# Patient Record
Sex: MALE | Race: BLACK OR AFRICAN AMERICAN | NOT HISPANIC OR LATINO | Employment: OTHER | URBAN - METROPOLITAN AREA
[De-identification: names, ages, dates, MRNs, and addresses within clinical notes are randomized per-mention and may not be internally consistent; named-entity substitution may affect disease eponyms.]

---

## 2024-10-10 ENCOUNTER — HOSPITAL ENCOUNTER (EMERGENCY)
Facility: HOSPITAL | Age: 74
Discharge: HOME | End: 2024-10-11
Attending: EMERGENCY MEDICINE
Payer: MEDICARE

## 2024-10-10 DIAGNOSIS — K94.01: Primary | ICD-10-CM

## 2024-10-10 LAB
ABO GROUP (TYPE) IN BLOOD: NORMAL
ALBUMIN SERPL BCP-MCNC: 4.5 G/DL (ref 3.4–5)
ALP SERPL-CCNC: 43 U/L (ref 33–136)
ALT SERPL W P-5'-P-CCNC: 9 U/L (ref 10–52)
ANION GAP SERPL CALC-SCNC: 13 MMOL/L (ref 10–20)
ANTIBODY SCREEN: NORMAL
APTT PPP: 39 SECONDS (ref 27–38)
AST SERPL W P-5'-P-CCNC: 20 U/L (ref 9–39)
BASOPHILS # BLD AUTO: 0.05 X10*3/UL (ref 0–0.1)
BASOPHILS NFR BLD AUTO: 0.7 %
BILIRUB SERPL-MCNC: 0.3 MG/DL (ref 0–1.2)
BUN SERPL-MCNC: 26 MG/DL (ref 6–23)
CALCIUM SERPL-MCNC: 9.4 MG/DL (ref 8.6–10.3)
CHLORIDE SERPL-SCNC: 103 MMOL/L (ref 98–107)
CO2 SERPL-SCNC: 25 MMOL/L (ref 21–32)
CREAT SERPL-MCNC: 1.83 MG/DL (ref 0.5–1.3)
EGFRCR SERPLBLD CKD-EPI 2021: 38 ML/MIN/1.73M*2
EOSINOPHIL # BLD AUTO: 0.32 X10*3/UL (ref 0–0.4)
EOSINOPHIL NFR BLD AUTO: 4.4 %
ERYTHROCYTE [DISTWIDTH] IN BLOOD BY AUTOMATED COUNT: 14.8 % (ref 11.5–14.5)
GLUCOSE SERPL-MCNC: 103 MG/DL (ref 74–99)
HCT VFR BLD AUTO: 33.3 % (ref 41–52)
HGB BLD-MCNC: 10.7 G/DL (ref 13.5–17.5)
IMM GRANULOCYTES # BLD AUTO: 0.03 X10*3/UL (ref 0–0.5)
IMM GRANULOCYTES NFR BLD AUTO: 0.4 % (ref 0–0.9)
INR PPP: 1 (ref 0.9–1.1)
LYMPHOCYTES # BLD AUTO: 1.32 X10*3/UL (ref 0.8–3)
LYMPHOCYTES NFR BLD AUTO: 18.2 %
MCH RBC QN AUTO: 24.1 PG (ref 26–34)
MCHC RBC AUTO-ENTMCNC: 32.1 G/DL (ref 32–36)
MCV RBC AUTO: 75 FL (ref 80–100)
MONOCYTES # BLD AUTO: 1.27 X10*3/UL (ref 0.05–0.8)
MONOCYTES NFR BLD AUTO: 17.5 %
NEUTROPHILS # BLD AUTO: 4.27 X10*3/UL (ref 1.6–5.5)
NEUTROPHILS NFR BLD AUTO: 58.8 %
NRBC BLD-RTO: 0 /100 WBCS (ref 0–0)
PLATELET # BLD AUTO: 313 X10*3/UL (ref 150–450)
POTASSIUM SERPL-SCNC: 4.6 MMOL/L (ref 3.5–5.3)
PROT SERPL-MCNC: 8.2 G/DL (ref 6.4–8.2)
PROTHROMBIN TIME: 11.1 SECONDS (ref 9.8–12.8)
RBC # BLD AUTO: 4.44 X10*6/UL (ref 4.5–5.9)
RH FACTOR (ANTIGEN D): NORMAL
SODIUM SERPL-SCNC: 136 MMOL/L (ref 136–145)
WBC # BLD AUTO: 7.3 X10*3/UL (ref 4.4–11.3)

## 2024-10-10 PROCEDURE — 85730 THROMBOPLASTIN TIME PARTIAL: CPT | Performed by: EMERGENCY MEDICINE

## 2024-10-10 PROCEDURE — 85610 PROTHROMBIN TIME: CPT | Performed by: EMERGENCY MEDICINE

## 2024-10-10 PROCEDURE — 86901 BLOOD TYPING SEROLOGIC RH(D): CPT | Performed by: EMERGENCY MEDICINE

## 2024-10-10 PROCEDURE — 80053 COMPREHEN METABOLIC PANEL: CPT | Performed by: EMERGENCY MEDICINE

## 2024-10-10 PROCEDURE — 99283 EMERGENCY DEPT VISIT LOW MDM: CPT

## 2024-10-10 PROCEDURE — 85025 COMPLETE CBC W/AUTO DIFF WBC: CPT | Performed by: EMERGENCY MEDICINE

## 2024-10-10 PROCEDURE — 36415 COLL VENOUS BLD VENIPUNCTURE: CPT | Performed by: EMERGENCY MEDICINE

## 2024-10-10 ASSESSMENT — LIFESTYLE VARIABLES
HAVE YOU EVER FELT YOU SHOULD CUT DOWN ON YOUR DRINKING: NO
EVER FELT BAD OR GUILTY ABOUT YOUR DRINKING: NO
TOTAL SCORE: 0
EVER HAD A DRINK FIRST THING IN THE MORNING TO STEADY YOUR NERVES TO GET RID OF A HANGOVER: NO
HAVE PEOPLE ANNOYED YOU BY CRITICIZING YOUR DRINKING: NO

## 2024-10-10 ASSESSMENT — COLUMBIA-SUICIDE SEVERITY RATING SCALE - C-SSRS
1. IN THE PAST MONTH, HAVE YOU WISHED YOU WERE DEAD OR WISHED YOU COULD GO TO SLEEP AND NOT WAKE UP?: NO
2. HAVE YOU ACTUALLY HAD ANY THOUGHTS OF KILLING YOURSELF?: NO
6. HAVE YOU EVER DONE ANYTHING, STARTED TO DO ANYTHING, OR PREPARED TO DO ANYTHING TO END YOUR LIFE?: NO

## 2024-10-11 VITALS
HEART RATE: 73 BPM | OXYGEN SATURATION: 99 % | BODY MASS INDEX: 26.81 KG/M2 | SYSTOLIC BLOOD PRESSURE: 149 MMHG | WEIGHT: 181 LBS | HEIGHT: 69 IN | DIASTOLIC BLOOD PRESSURE: 73 MMHG | RESPIRATION RATE: 15 BRPM

## 2024-10-11 ASSESSMENT — PAIN SCALES - GENERAL: PAINLEVEL_OUTOF10: 0 - NO PAIN

## 2024-10-11 NOTE — ED PROVIDER NOTES
HPI   Chief Complaint   Patient presents with    Colostomy Issues       This is a 74-year-old male who presents from nursing home for blood in his ostomy.  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  Patient says that he has often had blood in his ostomy and says that staff noticed a large clot when they change the ostomy bag as per protocol this evening.  He denies any abdominal pain.  He is not on any blood thinners aside from baby aspirin as per SNF record.      History provided by:  Medical records, nursing home and patient   used: No            Patient History   No past medical history on file.  Past Surgical History:   Procedure Laterality Date    MR HEAD ANGIO WO IV CONTRAST  2/4/2018    MR HEAD ANGIO WO IV CONTRAST 2/4/2018 [facility] CLINICAL LEGACY    MR NECK ANGIO WO IV CONTRAST  2/4/2018    MR NECK ANGIO WO IV CONTRAST 2/4/2018 [facility] CLINICAL LEGACY     No family history on file.  Social History     Tobacco Use    Smoking status: Not on file    Smokeless tobacco: Not on file   Substance Use Topics    Alcohol use: Not on file    Drug use: Not on file       Physical Exam   ED Triage Vitals [10/10/24 2209]   Temp Heart Rate Respirations BP   -- 81 18 166/90      Pulse Ox Temp src Heart Rate Source Patient Position   98 % -- -- --      BP Location FiO2 (%)     -- --       Physical Exam  Vitals and nursing note reviewed.   Constitutional:       General: He is not in acute distress.     Appearance: He is not ill-appearing, toxic-appearing or diaphoretic.   HENT:      Head:      Comments: Facial droop consistent with known prior stroke without acute traumatic injury     Nose: Nose normal. No congestion.      Mouth/Throat:      Mouth: Mucous membranes are moist.   Eyes:      General: No scleral icterus.        Right eye: No discharge.         Left eye: No discharge.      Extraocular Movements: Extraocular movements intact.      Conjunctiva/sclera:  Conjunctivae normal.   Cardiovascular:      Rate and Rhythm: Normal rate and regular rhythm.      Heart sounds: No murmur heard.     No friction rub. No gallop.   Pulmonary:      Effort: Pulmonary effort is normal. No respiratory distress.      Breath sounds: Normal breath sounds. No stridor. No wheezing, rhonchi or rales.   Abdominal:      General: There is distension.      Palpations: Abdomen is soft.      Tenderness: There is no abdominal tenderness. There is no guarding or rebound.   Musculoskeletal:         General: No tenderness, deformity or signs of injury. Normal range of motion.      Cervical back: Normal range of motion and neck supple. No rigidity or tenderness.   Skin:     General: Skin is warm and dry.      Coloration: Skin is not jaundiced or pale.      Findings: No bruising, erythema, lesion or rash.   Neurological:      General: No focal deficit present.      Mental Status: He is alert and oriented to person, place, and time.      Cranial Nerves: No cranial nerve deficit.      Sensory: No sensory deficit.      Motor: No weakness.   Psychiatric:         Mood and Affect: Mood normal.         Behavior: Behavior normal.           ED Course & MDM   ED Course as of 10/13/24 1827   Thu Oct 10, 2024   2351 Labs reviewed, chronic anemia hemoglobin 10.7 suspected baseline though no recent labs available for comparison, creatinine 1.83 CKD his baseline labs were 2 years ago [EK]      ED Course User Index  [EK] Elyse H Klerman, MD         Diagnoses as of 10/13/24 1827   Bleeding from colostomy (Multi)                 No data recorded     [place] Coma Scale Score: 15 (10/10/24 2214 : Sandro Lebron RN)                           Medical Decision Making  74-year-old male sent from nursing home for blood and possible blood clot noted in changing his ostomy bag.  Patient chronically ill but not acutely so, hypertensive on ED arrival and abdomen benign/nontender.  Labs with stable with 10.7, suspected to be his  baseline though no recent labs available for comparison, creatinine 1.83 consistent with CKD based on his labs from 2 years ago.  He reports that he often has amount of blood in his ostomy and that he is managing this with his outpatient doctors.  He has no pain and does not feel any different from his usual, thus I do not believe that any further workup is warranted at this time, including no imaging or admission.  Okay to DC back to facility with plan for outpatient follow-up for ostomy management and monitoring of his hemoglobin and GI bleed.    Amount and/or Complexity of Data Reviewed  External Data Reviewed: labs.  Labs: ordered. Decision-making details documented in ED Course.    Risk  Decision regarding hospitalization.        Procedure  Procedures     Elyse H Klerman, MD  10/13/24 5202

## 2024-10-11 NOTE — DISCHARGE INSTRUCTIONS
You were seen in the ED for bleeding from your colostomy bag.  Your hemoglobin is 10.3.  I do not have any recent labs, but this appears to be within your usual range and you had no further bleeding during your ED stay.  Please follow up with your PCP and GI doctor for management of your ostomy and GI bleeding.  Return to the ED for further concerns.

## 2024-10-11 NOTE — ED TRIAGE NOTES
Have Your Skin Lesions Been Treated?: not been treated
Pt presents to ED with c/o colostomy issues per SNF. Per report, pt had bag changed today and staff noticed a clot inside. Pt denies this, denies any complaints. Pt has hx of CVA.  
Is This A New Presentation, Or A Follow-Up?: Skin Lesions

## 2025-06-15 ENCOUNTER — APPOINTMENT (OUTPATIENT)
Dept: RADIOLOGY | Facility: HOSPITAL | Age: 75
DRG: 689 | End: 2025-06-15
Payer: MEDICARE

## 2025-06-15 ENCOUNTER — APPOINTMENT (OUTPATIENT)
Dept: CARDIOLOGY | Facility: HOSPITAL | Age: 75
DRG: 689 | End: 2025-06-15
Payer: MEDICARE

## 2025-06-15 ENCOUNTER — HOSPITAL ENCOUNTER (INPATIENT)
Facility: HOSPITAL | Age: 75
LOS: 4 days | Discharge: NURSING, REHAB OR LONG-TERM CARE | DRG: 689 | End: 2025-06-19
Attending: EMERGENCY MEDICINE | Admitting: INTERNAL MEDICINE
Payer: MEDICARE

## 2025-06-15 DIAGNOSIS — I63.9 CEREBRAL INFARCTION, UNSPECIFIED: ICD-10-CM

## 2025-06-15 DIAGNOSIS — N30.01 ACUTE CYSTITIS WITH HEMATURIA: ICD-10-CM

## 2025-06-15 DIAGNOSIS — G45.9 TRANSIENT CEREBRAL ISCHEMIA, UNSPECIFIED TYPE: ICD-10-CM

## 2025-06-15 DIAGNOSIS — N17.9 AKI (ACUTE KIDNEY INJURY): ICD-10-CM

## 2025-06-15 DIAGNOSIS — R79.89 ELEVATED TROPONIN: ICD-10-CM

## 2025-06-15 DIAGNOSIS — R29.90 STROKE-LIKE SYMPTOMS: Primary | ICD-10-CM

## 2025-06-15 DIAGNOSIS — Z86.73 HISTORY OF ISCHEMIC STROKE: ICD-10-CM

## 2025-06-15 DIAGNOSIS — I63.9 ACUTE CVA (CEREBROVASCULAR ACCIDENT) (MULTI): ICD-10-CM

## 2025-06-15 LAB
ALBUMIN SERPL BCP-MCNC: 3.2 G/DL (ref 3.4–5)
ALP SERPL-CCNC: 25 U/L (ref 33–136)
ALT SERPL W P-5'-P-CCNC: 16 U/L (ref 10–52)
ANION GAP SERPL CALC-SCNC: 12 MMOL/L (ref 10–20)
APPEARANCE UR: ABNORMAL
APTT PPP: 34 SECONDS (ref 26–36)
AST SERPL W P-5'-P-CCNC: 30 U/L (ref 9–39)
BACTERIA #/AREA URNS AUTO: ABNORMAL /HPF
BASOPHILS # BLD MANUAL: 0 X10*3/UL (ref 0–0.1)
BASOPHILS NFR BLD MANUAL: 0 %
BILIRUB SERPL-MCNC: 0.6 MG/DL (ref 0–1.2)
BILIRUB UR STRIP.AUTO-MCNC: NEGATIVE MG/DL
BNP SERPL-MCNC: 168 PG/ML (ref 0–99)
BUN SERPL-MCNC: 30 MG/DL (ref 6–23)
CALCIUM SERPL-MCNC: 7.7 MG/DL (ref 8.6–10.3)
CARDIAC TROPONIN I PNL SERPL HS: 268 NG/L (ref 0–20)
CARDIAC TROPONIN I PNL SERPL HS: 290 NG/L (ref 0–20)
CHLORIDE SERPL-SCNC: 94 MMOL/L (ref 98–107)
CHOLEST SERPL-MCNC: 174 MG/DL (ref 0–199)
CHOLESTEROL/HDL RATIO: 4
CO2 SERPL-SCNC: 21 MMOL/L (ref 21–32)
COLOR UR: ABNORMAL
CREAT SERPL-MCNC: 2.69 MG/DL (ref 0.5–1.3)
CREAT UR-MCNC: 128.7 MG/DL (ref 20–370)
DOHLE BOD BLD QL SMEAR: PRESENT
EGFRCR SERPLBLD CKD-EPI 2021: 24 ML/MIN/1.73M*2
EOSINOPHIL # BLD MANUAL: 0 X10*3/UL (ref 0–0.4)
EOSINOPHIL NFR BLD MANUAL: 0 %
ERYTHROCYTE [DISTWIDTH] IN BLOOD BY AUTOMATED COUNT: 16.2 % (ref 11.5–14.5)
EST. AVERAGE GLUCOSE BLD GHB EST-MCNC: 111 MG/DL
GLUCOSE BLD MANUAL STRIP-MCNC: 88 MG/DL (ref 74–99)
GLUCOSE BLD MANUAL STRIP-MCNC: 92 MG/DL (ref 74–99)
GLUCOSE SERPL-MCNC: 88 MG/DL (ref 74–99)
GLUCOSE UR STRIP.AUTO-MCNC: NORMAL MG/DL
HBA1C MFR BLD: 5.5 % (ref ?–5.7)
HCT VFR BLD AUTO: 27.5 % (ref 41–52)
HDLC SERPL-MCNC: 43.3 MG/DL
HGB BLD-MCNC: 8.7 G/DL (ref 13.5–17.5)
HYPOCHROMIA BLD QL SMEAR: ABNORMAL
IMM GRANULOCYTES # BLD AUTO: 0.15 X10*3/UL (ref 0–0.5)
IMM GRANULOCYTES NFR BLD AUTO: 0.9 % (ref 0–0.9)
INR PPP: 1.4 (ref 0.9–1.1)
IRON SATN MFR SERPL: 4 % (ref 25–45)
IRON SERPL-MCNC: 12 UG/DL (ref 35–150)
KETONES UR STRIP.AUTO-MCNC: NEGATIVE MG/DL
LACTATE SERPL-SCNC: 1.5 MMOL/L (ref 0.4–2)
LDLC SERPL CALC-MCNC: 91 MG/DL
LEUKOCYTE ESTERASE UR QL STRIP.AUTO: ABNORMAL
LYMPHOCYTES # BLD MANUAL: 0.51 X10*3/UL (ref 0.8–3)
LYMPHOCYTES NFR BLD MANUAL: 3 %
MCH RBC QN AUTO: 23.6 PG (ref 26–34)
MCHC RBC AUTO-ENTMCNC: 31.6 G/DL (ref 32–36)
MCV RBC AUTO: 75 FL (ref 80–100)
METAMYELOCYTES # BLD MANUAL: 0.17 X10*3/UL
METAMYELOCYTES NFR BLD MANUAL: 1 %
MONOCYTES # BLD MANUAL: 0.68 X10*3/UL (ref 0.05–0.8)
MONOCYTES NFR BLD MANUAL: 4 %
NEUTROPHILS # BLD MANUAL: 15.64 X10*3/UL (ref 1.6–5.5)
NEUTS BAND # BLD MANUAL: 2.38 X10*3/UL (ref 0–0.5)
NEUTS BAND NFR BLD MANUAL: 14 %
NEUTS SEG # BLD MANUAL: 13.26 X10*3/UL (ref 1.6–5)
NEUTS SEG NFR BLD MANUAL: 78 %
NEUTS VAC BLD QL SMEAR: PRESENT
NITRITE UR QL STRIP.AUTO: NEGATIVE
NON HDL CHOLESTEROL: 131 MG/DL (ref 0–149)
NRBC BLD-RTO: 0 /100 WBCS (ref 0–0)
OVALOCYTES BLD QL SMEAR: ABNORMAL
PH UR STRIP.AUTO: 7 [PH]
PLATELET # BLD AUTO: 205 X10*3/UL (ref 150–450)
POCT GLUCOSE: 88 MG/DL (ref 74–99)
POLYCHROMASIA BLD QL SMEAR: ABNORMAL
POTASSIUM SERPL-SCNC: 4.2 MMOL/L (ref 3.5–5.3)
PROT SERPL-MCNC: 5.7 G/DL (ref 6.4–8.2)
PROT UR STRIP.AUTO-MCNC: ABNORMAL MG/DL
PROTHROMBIN TIME: 15.1 SECONDS (ref 9.8–12.4)
RBC # BLD AUTO: 3.68 X10*6/UL (ref 4.5–5.9)
RBC # UR STRIP.AUTO: ABNORMAL MG/DL
RBC #/AREA URNS AUTO: >20 /HPF
RBC MORPH BLD: ABNORMAL
SODIUM SERPL-SCNC: 123 MMOL/L (ref 136–145)
SODIUM UR-SCNC: 65 MMOL/L
SODIUM/CREAT UR-RTO: 51 MMOL/G CREAT
SP GR UR STRIP.AUTO: 1.05
TARGETS BLD QL SMEAR: ABNORMAL
TIBC SERPL-MCNC: 281 UG/DL (ref 240–445)
TOTAL CELLS COUNTED BLD: 100
TRIGL SERPL-MCNC: 198 MG/DL (ref 0–149)
UIBC SERPL-MCNC: 269 UG/DL (ref 110–370)
UROBILINOGEN UR STRIP.AUTO-MCNC: NORMAL MG/DL
VLDL: 40 MG/DL (ref 0–40)
WBC # BLD AUTO: 17 X10*3/UL (ref 4.4–11.3)
WBC #/AREA URNS AUTO: >50 /HPF
WBC CLUMPS #/AREA URNS AUTO: ABNORMAL /HPF

## 2025-06-15 PROCEDURE — 1200000002 HC GENERAL ROOM WITH TELEMETRY DAILY

## 2025-06-15 PROCEDURE — 83930 ASSAY OF BLOOD OSMOLALITY: CPT | Mod: PARLAB

## 2025-06-15 PROCEDURE — 82947 ASSAY GLUCOSE BLOOD QUANT: CPT

## 2025-06-15 PROCEDURE — 36415 COLL VENOUS BLD VENIPUNCTURE: CPT | Performed by: EMERGENCY MEDICINE

## 2025-06-15 PROCEDURE — 70496 CT ANGIOGRAPHY HEAD: CPT | Performed by: RADIOLOGY

## 2025-06-15 PROCEDURE — 71045 X-RAY EXAM CHEST 1 VIEW: CPT

## 2025-06-15 PROCEDURE — 80061 LIPID PANEL: CPT

## 2025-06-15 PROCEDURE — 81001 URINALYSIS AUTO W/SCOPE: CPT | Performed by: EMERGENCY MEDICINE

## 2025-06-15 PROCEDURE — 70498 CT ANGIOGRAPHY NECK: CPT | Performed by: RADIOLOGY

## 2025-06-15 PROCEDURE — 2550000001 HC RX 255 CONTRASTS: Performed by: EMERGENCY MEDICINE

## 2025-06-15 PROCEDURE — 83550 IRON BINDING TEST: CPT

## 2025-06-15 PROCEDURE — 2500000001 HC RX 250 WO HCPCS SELF ADMINISTERED DRUGS (ALT 637 FOR MEDICARE OP)

## 2025-06-15 PROCEDURE — 2500000004 HC RX 250 GENERAL PHARMACY W/ HCPCS (ALT 636 FOR OP/ED)

## 2025-06-15 PROCEDURE — 84484 ASSAY OF TROPONIN QUANT: CPT | Performed by: EMERGENCY MEDICINE

## 2025-06-15 PROCEDURE — 74176 CT ABD & PELVIS W/O CONTRAST: CPT

## 2025-06-15 PROCEDURE — 85730 THROMBOPLASTIN TIME PARTIAL: CPT | Performed by: EMERGENCY MEDICINE

## 2025-06-15 PROCEDURE — 70450 CT HEAD/BRAIN W/O DYE: CPT

## 2025-06-15 PROCEDURE — 96365 THER/PROPH/DIAG IV INF INIT: CPT

## 2025-06-15 PROCEDURE — 70498 CT ANGIOGRAPHY NECK: CPT

## 2025-06-15 PROCEDURE — 83605 ASSAY OF LACTIC ACID: CPT | Performed by: EMERGENCY MEDICINE

## 2025-06-15 PROCEDURE — 83036 HEMOGLOBIN GLYCOSYLATED A1C: CPT | Mod: PARLAB

## 2025-06-15 PROCEDURE — 99285 EMERGENCY DEPT VISIT HI MDM: CPT | Mod: 25 | Performed by: EMERGENCY MEDICINE

## 2025-06-15 PROCEDURE — 85027 COMPLETE CBC AUTOMATED: CPT | Performed by: EMERGENCY MEDICINE

## 2025-06-15 PROCEDURE — 93005 ELECTROCARDIOGRAM TRACING: CPT

## 2025-06-15 PROCEDURE — 74176 CT ABD & PELVIS W/O CONTRAST: CPT | Mod: FOREIGN READ | Performed by: RADIOLOGY

## 2025-06-15 PROCEDURE — 71045 X-RAY EXAM CHEST 1 VIEW: CPT | Mod: FOREIGN READ | Performed by: RADIOLOGY

## 2025-06-15 PROCEDURE — 96361 HYDRATE IV INFUSION ADD-ON: CPT

## 2025-06-15 PROCEDURE — 85610 PROTHROMBIN TIME: CPT | Performed by: EMERGENCY MEDICINE

## 2025-06-15 PROCEDURE — 83880 ASSAY OF NATRIURETIC PEPTIDE: CPT

## 2025-06-15 PROCEDURE — 2500000004 HC RX 250 GENERAL PHARMACY W/ HCPCS (ALT 636 FOR OP/ED): Performed by: EMERGENCY MEDICINE

## 2025-06-15 PROCEDURE — 85007 BL SMEAR W/DIFF WBC COUNT: CPT | Performed by: EMERGENCY MEDICINE

## 2025-06-15 PROCEDURE — 70450 CT HEAD/BRAIN W/O DYE: CPT | Performed by: RADIOLOGY

## 2025-06-15 PROCEDURE — 82570 ASSAY OF URINE CREATININE: CPT

## 2025-06-15 PROCEDURE — 80053 COMPREHEN METABOLIC PANEL: CPT | Performed by: EMERGENCY MEDICINE

## 2025-06-15 PROCEDURE — 82728 ASSAY OF FERRITIN: CPT | Mod: PARLAB

## 2025-06-15 PROCEDURE — 83935 ASSAY OF URINE OSMOLALITY: CPT | Mod: PARLAB

## 2025-06-15 RX ORDER — HEPARIN SODIUM 5000 [USP'U]/ML
5000 INJECTION, SOLUTION INTRAVENOUS; SUBCUTANEOUS EVERY 8 HOURS
Status: DISCONTINUED | OUTPATIENT
Start: 2025-06-15 | End: 2025-06-19 | Stop reason: HOSPADM

## 2025-06-15 RX ORDER — MULTIVIT-MIN/IRON FUM/FOLIC AC 7.5 MG-4
1 TABLET ORAL DAILY
COMMUNITY

## 2025-06-15 RX ORDER — DICLOFENAC SODIUM 10 MG/G
2 GEL TOPICAL 2 TIMES DAILY
Status: DISCONTINUED | OUTPATIENT
Start: 2025-06-15 | End: 2025-06-19 | Stop reason: HOSPADM

## 2025-06-15 RX ORDER — HYDRALAZINE HYDROCHLORIDE 50 MG/1
50 TABLET, FILM COATED ORAL 3 TIMES DAILY
Status: DISCONTINUED | OUTPATIENT
Start: 2025-06-15 | End: 2025-06-19 | Stop reason: HOSPADM

## 2025-06-15 RX ORDER — ASPIRIN 81 MG/1
81 TABLET ORAL DAILY
Status: DISCONTINUED | OUTPATIENT
Start: 2025-06-16 | End: 2025-06-19 | Stop reason: HOSPADM

## 2025-06-15 RX ORDER — OXYCODONE AND ACETAMINOPHEN 5; 325 MG/1; MG/1
1 TABLET ORAL EVERY 8 HOURS PRN
Status: DISCONTINUED | OUTPATIENT
Start: 2025-06-15 | End: 2025-06-19 | Stop reason: HOSPADM

## 2025-06-15 RX ORDER — FERROUS SULFATE 325(65) MG
1 TABLET ORAL 2 TIMES DAILY
COMMUNITY

## 2025-06-15 RX ORDER — ACETAMINOPHEN 500 MG
50 TABLET ORAL DAILY
COMMUNITY

## 2025-06-15 RX ORDER — CHOLECALCIFEROL (VITAMIN D3) 25 MCG
50 TABLET ORAL DAILY
Status: DISCONTINUED | OUTPATIENT
Start: 2025-06-16 | End: 2025-06-19 | Stop reason: HOSPADM

## 2025-06-15 RX ORDER — METOPROLOL TARTRATE 50 MG/1
75 TABLET ORAL 2 TIMES DAILY
Status: DISCONTINUED | OUTPATIENT
Start: 2025-06-15 | End: 2025-06-19 | Stop reason: HOSPADM

## 2025-06-15 RX ORDER — DOCUSATE SODIUM 100 MG/1
100 CAPSULE, LIQUID FILLED ORAL DAILY
COMMUNITY

## 2025-06-15 RX ORDER — LABETALOL HYDROCHLORIDE 5 MG/ML
10 INJECTION, SOLUTION INTRAVENOUS EVERY 10 MIN PRN
Status: ACTIVE | OUTPATIENT
Start: 2025-06-15 | End: 2025-06-17

## 2025-06-15 RX ORDER — FAMOTIDINE 20 MG/1
20 TABLET, FILM COATED ORAL DAILY
COMMUNITY

## 2025-06-15 RX ORDER — FENOFIBRATE 160 MG/1
160 TABLET ORAL DAILY
Status: DISCONTINUED | OUTPATIENT
Start: 2025-06-16 | End: 2025-06-19 | Stop reason: HOSPADM

## 2025-06-15 RX ORDER — SODIUM CHLORIDE 9 MG/ML
75 INJECTION, SOLUTION INTRAVENOUS CONTINUOUS
Status: ACTIVE | OUTPATIENT
Start: 2025-06-15 | End: 2025-06-16

## 2025-06-15 RX ORDER — CEFTRIAXONE 1 G/50ML
1 INJECTION, SOLUTION INTRAVENOUS ONCE
Status: COMPLETED | OUTPATIENT
Start: 2025-06-15 | End: 2025-06-15

## 2025-06-15 RX ORDER — FAMOTIDINE 20 MG/1
20 TABLET, FILM COATED ORAL DAILY
Status: DISCONTINUED | OUTPATIENT
Start: 2025-06-16 | End: 2025-06-19 | Stop reason: HOSPADM

## 2025-06-15 RX ORDER — DOCUSATE SODIUM 100 MG/1
100 CAPSULE, LIQUID FILLED ORAL DAILY
Status: DISCONTINUED | OUTPATIENT
Start: 2025-06-16 | End: 2025-06-19 | Stop reason: HOSPADM

## 2025-06-15 RX ORDER — ACETAMINOPHEN 500 MG
1000 TABLET ORAL 2 TIMES DAILY
COMMUNITY

## 2025-06-15 RX ORDER — DONEPEZIL HYDROCHLORIDE 10 MG/1
10 TABLET, FILM COATED ORAL NIGHTLY
Status: DISCONTINUED | OUTPATIENT
Start: 2025-06-15 | End: 2025-06-19 | Stop reason: HOSPADM

## 2025-06-15 RX ORDER — HYDRALAZINE HYDROCHLORIDE 50 MG/1
50 TABLET, FILM COATED ORAL 3 TIMES DAILY
COMMUNITY

## 2025-06-15 RX ORDER — NAPROXEN SODIUM 220 MG/1
81 TABLET, FILM COATED ORAL DAILY
Status: CANCELLED | OUTPATIENT
Start: 2025-06-16

## 2025-06-15 RX ORDER — NAPROXEN SODIUM 220 MG/1
81 TABLET, FILM COATED ORAL DAILY
COMMUNITY

## 2025-06-15 RX ORDER — ASPIRIN 81 MG/1
81 TABLET ORAL DAILY
Status: CANCELLED | OUTPATIENT
Start: 2025-06-16

## 2025-06-15 RX ORDER — CEFTRIAXONE 1 G/50ML
1 INJECTION, SOLUTION INTRAVENOUS EVERY 24 HOURS
Status: DISCONTINUED | OUTPATIENT
Start: 2025-06-16 | End: 2025-06-19 | Stop reason: HOSPADM

## 2025-06-15 RX ORDER — DICLOFENAC SODIUM 10 MG/G
2 GEL TOPICAL 2 TIMES DAILY
COMMUNITY

## 2025-06-15 RX ORDER — OXYCODONE AND ACETAMINOPHEN 5; 325 MG/1; MG/1
1 TABLET ORAL EVERY 12 HOURS PRN
COMMUNITY

## 2025-06-15 RX ORDER — HYDRALAZINE HYDROCHLORIDE 25 MG/1
25 TABLET, FILM COATED ORAL 3 TIMES DAILY PRN
COMMUNITY
End: 2025-06-19 | Stop reason: HOSPADM

## 2025-06-15 RX ORDER — AMLODIPINE BESYLATE 10 MG/1
10 TABLET ORAL DAILY
Status: DISCONTINUED | OUTPATIENT
Start: 2025-06-16 | End: 2025-06-19 | Stop reason: HOSPADM

## 2025-06-15 RX ORDER — DONEPEZIL HYDROCHLORIDE 10 MG/1
10 TABLET, FILM COATED ORAL NIGHTLY
COMMUNITY

## 2025-06-15 RX ORDER — FENOFIBRATE 200 MG/1
200 CAPSULE ORAL DAILY
COMMUNITY

## 2025-06-15 RX ORDER — METOPROLOL TARTRATE 75 MG/1
75 TABLET ORAL 2 TIMES DAILY
COMMUNITY

## 2025-06-15 RX ORDER — AMLODIPINE BESYLATE 10 MG/1
10 TABLET ORAL DAILY
COMMUNITY

## 2025-06-15 RX ADMIN — CEFTRIAXONE 1 G: 1 INJECTION, SOLUTION INTRAVENOUS at 17:26

## 2025-06-15 RX ADMIN — HEPARIN SODIUM 5000 UNITS: 5000 INJECTION, SOLUTION INTRAVENOUS; SUBCUTANEOUS at 23:03

## 2025-06-15 RX ADMIN — SODIUM CHLORIDE, SODIUM LACTATE, POTASSIUM CHLORIDE, AND CALCIUM CHLORIDE 1000 ML: .6; .31; .03; .02 INJECTION, SOLUTION INTRAVENOUS at 16:47

## 2025-06-15 RX ADMIN — IOHEXOL 75 ML: 350 INJECTION, SOLUTION INTRAVENOUS at 15:53

## 2025-06-15 RX ADMIN — DONEPEZIL HYDROCHLORIDE 10 MG: 10 TABLET ORAL at 23:03

## 2025-06-15 RX ADMIN — SODIUM CHLORIDE 75 ML/HR: 0.9 INJECTION, SOLUTION INTRAVENOUS at 23:03

## 2025-06-15 SDOH — HEALTH STABILITY: PHYSICAL HEALTH
HOW OFTEN DO YOU NEED TO HAVE SOMEONE HELP YOU WHEN YOU READ INSTRUCTIONS, PAMPHLETS, OR OTHER WRITTEN MATERIAL FROM YOUR DOCTOR OR PHARMACY?: RARELY

## 2025-06-15 SDOH — SOCIAL STABILITY: SOCIAL INSECURITY: WITHIN THE LAST YEAR, HAVE YOU BEEN AFRAID OF YOUR PARTNER OR EX-PARTNER?: NO

## 2025-06-15 SDOH — ECONOMIC STABILITY: INCOME INSECURITY: IN THE PAST 12 MONTHS HAS THE ELECTRIC, GAS, OIL, OR WATER COMPANY THREATENED TO SHUT OFF SERVICES IN YOUR HOME?: NO

## 2025-06-15 SDOH — HEALTH STABILITY: MENTAL HEALTH
DO YOU FEEL STRESS - TENSE, RESTLESS, NERVOUS, OR ANXIOUS, OR UNABLE TO SLEEP AT NIGHT BECAUSE YOUR MIND IS TROUBLED ALL THE TIME - THESE DAYS?: NOT AT ALL

## 2025-06-15 SDOH — SOCIAL STABILITY: SOCIAL NETWORK: HOW OFTEN DO YOU ATTEND CHURCH OR RELIGIOUS SERVICES?: PATIENT DECLINED

## 2025-06-15 SDOH — SOCIAL STABILITY: SOCIAL INSECURITY
WITHIN THE LAST YEAR, HAVE YOU BEEN RAPED OR FORCED TO HAVE ANY KIND OF SEXUAL ACTIVITY BY YOUR PARTNER OR EX-PARTNER?: NO

## 2025-06-15 SDOH — SOCIAL STABILITY: SOCIAL INSECURITY: ARE THERE ANY APPARENT SIGNS OF INJURIES/BEHAVIORS THAT COULD BE RELATED TO ABUSE/NEGLECT?: NO

## 2025-06-15 SDOH — ECONOMIC STABILITY: HOUSING INSECURITY: IN THE PAST 12 MONTHS, HOW MANY TIMES HAVE YOU MOVED WHERE YOU WERE LIVING?: 0

## 2025-06-15 SDOH — ECONOMIC STABILITY: FOOD INSECURITY: WITHIN THE PAST 12 MONTHS, THE FOOD YOU BOUGHT JUST DIDN'T LAST AND YOU DIDN'T HAVE MONEY TO GET MORE.: NEVER TRUE

## 2025-06-15 SDOH — SOCIAL STABILITY: SOCIAL NETWORK
DO YOU BELONG TO ANY CLUBS OR ORGANIZATIONS SUCH AS CHURCH GROUPS, UNIONS, FRATERNAL OR ATHLETIC GROUPS, OR SCHOOL GROUPS?: PATIENT DECLINED

## 2025-06-15 SDOH — SOCIAL STABILITY: SOCIAL INSECURITY
WITHIN THE LAST YEAR, HAVE YOU BEEN KICKED, HIT, SLAPPED, OR OTHERWISE PHYSICALLY HURT BY YOUR PARTNER OR EX-PARTNER?: NO

## 2025-06-15 SDOH — SOCIAL STABILITY: SOCIAL NETWORK
IN A TYPICAL WEEK, HOW MANY TIMES DO YOU TALK ON THE PHONE WITH FAMILY, FRIENDS, OR NEIGHBORS?: MORE THAN THREE TIMES A WEEK

## 2025-06-15 SDOH — SOCIAL STABILITY: SOCIAL INSECURITY: DO YOU FEEL UNSAFE GOING BACK TO THE PLACE WHERE YOU ARE LIVING?: NO

## 2025-06-15 SDOH — ECONOMIC STABILITY: FOOD INSECURITY: WITHIN THE PAST 12 MONTHS, YOU WORRIED THAT YOUR FOOD WOULD RUN OUT BEFORE YOU GOT THE MONEY TO BUY MORE.: NEVER TRUE

## 2025-06-15 SDOH — SOCIAL STABILITY: SOCIAL NETWORK: HOW OFTEN DO YOU ATTEND MEETINGS OF THE CLUBS OR ORGANIZATIONS YOU BELONG TO?: PATIENT DECLINED

## 2025-06-15 SDOH — SOCIAL STABILITY: SOCIAL INSECURITY: HAVE YOU HAD ANY THOUGHTS OF HARMING ANYONE ELSE?: NO

## 2025-06-15 SDOH — SOCIAL STABILITY: SOCIAL NETWORK: HOW OFTEN DO YOU GET TOGETHER WITH FRIENDS OR RELATIVES?: TWICE A WEEK

## 2025-06-15 SDOH — SOCIAL STABILITY: SOCIAL INSECURITY: HAVE YOU HAD THOUGHTS OF HARMING ANYONE ELSE?: NO

## 2025-06-15 SDOH — SOCIAL STABILITY: SOCIAL INSECURITY: ARE YOU MARRIED, WIDOWED, DIVORCED, SEPARATED, NEVER MARRIED, OR LIVING WITH A PARTNER?: PATIENT DECLINED

## 2025-06-15 SDOH — SOCIAL STABILITY: SOCIAL INSECURITY: DO YOU FEEL ANYONE HAS EXPLOITED OR TAKEN ADVANTAGE OF YOU FINANCIALLY OR OF YOUR PERSONAL PROPERTY?: NO

## 2025-06-15 SDOH — SOCIAL STABILITY: SOCIAL INSECURITY: WITHIN THE LAST YEAR, HAVE YOU BEEN HUMILIATED OR EMOTIONALLY ABUSED IN OTHER WAYS BY YOUR PARTNER OR EX-PARTNER?: NO

## 2025-06-15 SDOH — SOCIAL STABILITY: SOCIAL INSECURITY: WERE YOU ABLE TO COMPLETE ALL THE BEHAVIORAL HEALTH SCREENINGS?: YES

## 2025-06-15 SDOH — SOCIAL STABILITY: SOCIAL INSECURITY: HAS ANYONE EVER THREATENED TO HURT YOUR FAMILY OR YOUR PETS?: NO

## 2025-06-15 SDOH — SOCIAL STABILITY: SOCIAL INSECURITY: ABUSE: ADULT

## 2025-06-15 SDOH — SOCIAL STABILITY: SOCIAL INSECURITY: ARE YOU OR HAVE YOU BEEN THREATENED OR ABUSED PHYSICALLY, EMOTIONALLY, OR SEXUALLY BY ANYONE?: NO

## 2025-06-15 SDOH — SOCIAL STABILITY: SOCIAL INSECURITY: DOES ANYONE TRY TO KEEP YOU FROM HAVING/CONTACTING OTHER FRIENDS OR DOING THINGS OUTSIDE YOUR HOME?: NO

## 2025-06-15 ASSESSMENT — PAIN SCALES - GENERAL: PAINLEVEL_OUTOF10: 0 - NO PAIN

## 2025-06-15 ASSESSMENT — ACTIVITIES OF DAILY LIVING (ADL)
JUDGMENT_ADEQUATE_SAFELY_COMPLETE_DAILY_ACTIVITIES: YES
ADEQUATE_TO_COMPLETE_ADL: YES
PATIENT'S MEMORY ADEQUATE TO SAFELY COMPLETE DAILY ACTIVITIES?: YES
TOILETING: NEEDS ASSISTANCE
ASSISTIVE_DEVICE: WHEELCHAIR
HEARING - LEFT EAR: FUNCTIONAL
DRESSING YOURSELF: NEEDS ASSISTANCE
WALKS IN HOME: DEPENDENT
LACK_OF_TRANSPORTATION: NO
BATHING: NEEDS ASSISTANCE
HEARING - RIGHT EAR: FUNCTIONAL
FEEDING YOURSELF: NEEDS ASSISTANCE
GROOMING: NEEDS ASSISTANCE

## 2025-06-15 ASSESSMENT — LIFESTYLE VARIABLES
HOW MANY STANDARD DRINKS CONTAINING ALCOHOL DO YOU HAVE ON A TYPICAL DAY: PATIENT DOES NOT DRINK
AUDIT-C TOTAL SCORE: 0
PRESCIPTION_ABUSE_PAST_12_MONTHS: NO
SKIP TO QUESTIONS 9-10: 1
HOW OFTEN DO YOU HAVE A DRINK CONTAINING ALCOHOL: NEVER
HOW OFTEN DO YOU HAVE 6 OR MORE DRINKS ON ONE OCCASION: NEVER
AUDIT-C TOTAL SCORE: 0
SUBSTANCE_ABUSE_PAST_12_MONTHS: NO

## 2025-06-15 ASSESSMENT — COGNITIVE AND FUNCTIONAL STATUS - GENERAL
WALKING IN HOSPITAL ROOM: TOTAL
EATING MEALS: A LITTLE
PATIENT BASELINE BEDBOUND: NO
STANDING UP FROM CHAIR USING ARMS: A LITTLE
MOBILITY SCORE: 14
TOILETING: A LOT
PERSONAL GROOMING: A LOT
TURNING FROM BACK TO SIDE WHILE IN FLAT BAD: A LITTLE
DRESSING REGULAR LOWER BODY CLOTHING: A LITTLE
CLIMB 3 TO 5 STEPS WITH RAILING: TOTAL
DRESSING REGULAR UPPER BODY CLOTHING: A LOT
DAILY ACTIVITIY SCORE: 15
MOVING TO AND FROM BED TO CHAIR: A LITTLE
MOVING FROM LYING ON BACK TO SITTING ON SIDE OF FLAT BED WITH BEDRAILS: A LITTLE
HELP NEEDED FOR BATHING: A LITTLE

## 2025-06-15 ASSESSMENT — PAIN - FUNCTIONAL ASSESSMENT: PAIN_FUNCTIONAL_ASSESSMENT: 0-10

## 2025-06-15 ASSESSMENT — PATIENT HEALTH QUESTIONNAIRE - PHQ9
SUM OF ALL RESPONSES TO PHQ9 QUESTIONS 1 & 2: 0
1. LITTLE INTEREST OR PLEASURE IN DOING THINGS: NOT AT ALL
2. FEELING DOWN, DEPRESSED OR HOPELESS: NOT AT ALL

## 2025-06-15 NOTE — ED TRIAGE NOTES
"Patient arrives by [facility] mobile stroke unit from [facility]. Per staff report, patient with worsening right hemiparesis and was \"clammy and lethargic\" on their assessment this morning. LKW 0800. Upon arrival of Stroke unit, patient hypotensive 80/52, 500cc LR bolus initiated and mentation improved. NIH 7, head CT negative. Patient does have hx of previous CVA with residual right sided deficits.   "

## 2025-06-15 NOTE — ED PROVIDER NOTES
Emergency Department Provider Note       History of Present Illness     History provided by: EMS [facility]  Limitations to History: pt with new stroke-like sx  External Records Reviewed with Brief Summary: prior CVA  with residual deficits, hx ostomy    HPI:  Brandon Garnica is a 75 y.o. male BIBEMS ([facility]) for new stroke sx - LKN ~10am and SNF called for new hemiparesis.  EMS noted NIHSS 7 but pt also hypotensive BP 80/52 (treated with 500cc IV NS, BP now 107 systolic on ED arrival).  FS 95 as per EMS.  Stroke code called prior to arrival though pt out of the window for thrombolytics.  As per SNF report, pt not on blood thinners.    Physical Exam   Triage vitals:  T    HR    BP    RR    O2        General: Awake, alert, in no acute distress  Eyes: Gaze conjugate.  No scleral icterus or injection  HENT: Normo-cephalic, atraumatic. No stridor  CV: Regular rate, regular rhythm  Resp: Breathing non-labored, speaking in full sentences.  Clear to auscultation bilaterally  GI: Soft, non-distended, non-tender. No rebound or guarding.  MSK/Extremities: No gross bony deformities. Moving all extremities  Skin: Warm. Appropriate color  Neuro: Alert. Oriented. R facial droop (old as per family), speech is mildly dysarthric, b/l LE drift, NIHSS 4  Psych: Appropriate mood and affect      Medical Decision Making & ED Course   Medical Decision Makin y.o. male presents from SNF for stroke-like sx - NIHSS 7 as per [facility], but NIHSS down to 4 on ED arrival (and mostly appears to be old/stable deficits from prior stroke).  LKN 8-10am, so pt out of window for thrombolytics.  CTH without ICH or subacute stroke, confirmed in discussion with radiology attending Dr. Lion.  CTA interpreted by radiology and telestroke attending Dr. Melchor without LVO.  Workup otherwise notable for hyponatremia, FREEMAN, leukocytosis, UTI, elevated troponin (268>290).  Pt treated with IV LR and  IV ceftriaxone.  On repeat assessment, NIHSS now 1 (baseline facial droop) ie resolved symptoms.  Suspect much of his neurologic presentation related to hypotension and infection, ie recrudescence of his old stroke symptoms in the setting of metabolic stressor, but cannot exclude new neurologic event.  Discussed with PCP Dr. Arora and house PAZ who accept admission.  Updated pt and family at bedside who are agreeable to plan of care.  ----      Differential diagnoses considered include but are not limited to: stroke, sepsis, UTI, PNA, electrolyte abnormalities, FREEMAN    Social Determinants of Health which Significantly Impact Care: Social Determinants of Health which Significantly Impact Care: SNF resident     EKG Independent Interpretation: EKG interpreted by myself. Please see ED Course for full interpretation.    Independent Result Review and Interpretation: Relevant laboratory and radiographic results were reviewed and independently interpreted by myself.  As necessary, they are commented on in the ED Course.    Chronic conditions affecting the patient's care: As documented above in MDM    The patient was discussed with the following consultants/services: see MDM    Care Considerations: As documented above in MDM    ED Course:  ED Course as of 06/16/25 2111   Sun Surendra 15, 2025   1621 CTH interpreted by me without ICH or midline shift [EK]   1622 CXR interpreted by me without pneumothorax or pneumonia [EK]   1901 EKG obtained given stroke-like symptoms and independently interpreted by me showing sinus rhythm with 1st deg AVB  normal axis otherwise normal intervals Qtc 436 no ischemic changes [EK]      ED Course User Index  [EK] Elyse H Klerman, MD         Diagnoses as of 06/16/25 2111   Stroke-like symptoms   FREEMAN (acute kidney injury)   Acute cystitis with hematuria   Elevated troponin       Disposition   As a result of their workup, the patient will require admission to the hospital.  The patient was  informed of his diagnosis.  The patient was given the opportunity to ask questions and I answered them. The patient agreed to be admitted to the hospital.    Procedures   Procedures        Elyse H Klerman, MD  Emergency Medicine                                                       Elyse H Klerman, MD  06/16/25 2119

## 2025-06-15 NOTE — PROGRESS NOTES
Pharmacy Medication History Review    Brandon Garnica is a 75 y.o. male admitted for No Principal Problem: There is no principal problem currently on the Problem List. Please update the Problem List and refresh.. Pharmacy reviewed the patient's lxomz-yl-ogzntnhef medications and allergies for accuracy.    The list below reflectives the updated PTA list. Please review each medication in order reconciliation for additional clarification and justification.  Prior to Admission medications    Medication Sig Start Date End Date Authorizing Provider   acetaminophen (Tylenol) 500 mg tablet Take 2 tablets (1,000 mg) by mouth 2 times a day.   Historical Provider, MD   amLODIPine (Norvasc) 10 mg tablet Take 1 tablet (10 mg) by mouth once daily.   Historical Provider, MD   aspirin 81 mg chewable tablet Chew 1 tablet (81 mg) once daily.   Historical Provider, MD   diclofenac sodium (Voltaren) 1 % gel Apply 2.25 inches (2 g) topically 2 times a day. To right knee   Historical Provider, MD   docusate sodium (Colace) 100 mg capsule Take 1 capsule (100 mg) by mouth once daily.   Historical Provider, MD   donepezil (Aricept) 10 mg tablet Take 1 tablet (10 mg) by mouth once daily at bedtime.   Historical Provider, MD   famotidine (Pepcid) 20 mg tablet Take 1 tablet (20 mg) by mouth once daily.   Historical Provider, MD   fenofibrate micronized (Lofibra) 200 mg capsule Take 1 capsule (200 mg) by mouth once daily.   Historical Provider, MD   ferrous sulfate 325 mg (65 mg elemental) tablet Take 1 tablet by mouth 2 times a day.   Historical Provider, MD   hydrALAZINE (Apresoline) 25 mg tablet Take 1 tablet (25 mg) by mouth 3 times a day as needed (SBP>160).   Historical Provider, MD   hydrALAZINE (Apresoline) 50 mg tablet Take 1 tablet (50 mg) by mouth 3 times a day.   Historical Provider, MD   metoprolol tartrate (Lopressor) 75 mg tablet Take 1 tablet (75 mg) by mouth 2 times a day.   Historical Provider, MD   oxyCODONE-acetaminophen  (Percocet) 5-325 mg tablet Take 1 tablet by mouth every 12 hours if needed (pain).   Historical Provider, MD   cholecalciferol (Vitamin D-3) 50 mcg (2,000 units) capsule Take 1 capsule (50 mcg) by mouth once daily.   Historical Provider, MD   multivitamin with minerals tablet Take 1 tablet by mouth once daily.   Historical Provider, MD        The list below reflectives the updated allergy list. Please review each documented allergy for additional clarification and justification.  Allergies  Reviewed by Yarelis Cornell on 6/15/2025        Severity Reactions Comments    Atorvastatin Low Myalgia             Below are additional concerns with the patient's PTA list.      Yarelis Cornell

## 2025-06-16 ENCOUNTER — APPOINTMENT (OUTPATIENT)
Dept: RADIOLOGY | Facility: HOSPITAL | Age: 75
DRG: 689 | End: 2025-06-16
Payer: MEDICARE

## 2025-06-16 PROBLEM — R53.1 WEAKNESS: Status: ACTIVE | Noted: 2025-06-16

## 2025-06-16 LAB
ALBUMIN SERPL BCP-MCNC: 3.8 G/DL (ref 3.4–5)
ANION GAP SERPL CALC-SCNC: 12 MMOL/L (ref 10–20)
ATRIAL RATE: 79 BPM
BUN SERPL-MCNC: 30 MG/DL (ref 6–23)
CA-I BLD-SCNC: 1.15 MMOL/L (ref 1.1–1.33)
CALCIUM SERPL-MCNC: 8.9 MG/DL (ref 8.6–10.3)
CHLORIDE SERPL-SCNC: 102 MMOL/L (ref 98–107)
CO2 SERPL-SCNC: 24 MMOL/L (ref 21–32)
CREAT SERPL-MCNC: 2.37 MG/DL (ref 0.5–1.3)
EGFRCR SERPLBLD CKD-EPI 2021: 28 ML/MIN/1.73M*2
ERYTHROCYTE [DISTWIDTH] IN BLOOD BY AUTOMATED COUNT: 16.4 % (ref 11.5–14.5)
FERRITIN SERPL-MCNC: 964 NG/ML (ref 20–300)
GLUCOSE BLD MANUAL STRIP-MCNC: 121 MG/DL (ref 74–99)
GLUCOSE BLD MANUAL STRIP-MCNC: 130 MG/DL (ref 74–99)
GLUCOSE BLD MANUAL STRIP-MCNC: 160 MG/DL (ref 74–99)
GLUCOSE SERPL-MCNC: 112 MG/DL (ref 74–99)
HCT VFR BLD AUTO: 32.6 % (ref 41–52)
HGB BLD-MCNC: 10.2 G/DL (ref 13.5–17.5)
MAGNESIUM SERPL-MCNC: 1.57 MG/DL (ref 1.6–2.4)
MCH RBC QN AUTO: 23.2 PG (ref 26–34)
MCHC RBC AUTO-ENTMCNC: 31.3 G/DL (ref 32–36)
MCV RBC AUTO: 74 FL (ref 80–100)
NRBC BLD-RTO: 0 /100 WBCS (ref 0–0)
OSMOLALITY SERPL: 289 MOSM/KG (ref 280–300)
OSMOLALITY UR: 458 MOSM/KG (ref 200–1200)
P AXIS: 60 DEGREES
PHOSPHATE SERPL-MCNC: 2.9 MG/DL (ref 2.5–4.9)
PLATELET # BLD AUTO: 208 X10*3/UL (ref 150–450)
POTASSIUM SERPL-SCNC: 4.1 MMOL/L (ref 3.5–5.3)
PR INTERVAL: 206 MS
PTH-INTACT SERPL-MCNC: 46.6 PG/ML (ref 18.5–88)
Q ONSET: 249 MS
QRS COUNT: 13 BEATS
QRS DURATION: 99 MS
QT INTERVAL: 380 MS
QTC CALCULATION(BAZETT): 436 MS
QTC FREDERICIA: 416 MS
R AXIS: 1 DEGREES
RBC # BLD AUTO: 4.39 X10*6/UL (ref 4.5–5.9)
SODIUM SERPL-SCNC: 134 MMOL/L (ref 136–145)
T AXIS: 62 DEGREES
T OFFSET: 439 MS
VENTRICULAR RATE: 79 BPM
WBC # BLD AUTO: 12.6 X10*3/UL (ref 4.4–11.3)

## 2025-06-16 PROCEDURE — 36415 COLL VENOUS BLD VENIPUNCTURE: CPT

## 2025-06-16 PROCEDURE — 70551 MRI BRAIN STEM W/O DYE: CPT | Performed by: RADIOLOGY

## 2025-06-16 PROCEDURE — 2500000004 HC RX 250 GENERAL PHARMACY W/ HCPCS (ALT 636 FOR OP/ED)

## 2025-06-16 PROCEDURE — 97161 PT EVAL LOW COMPLEX 20 MIN: CPT | Mod: GP

## 2025-06-16 PROCEDURE — 99223 1ST HOSP IP/OBS HIGH 75: CPT | Performed by: PSYCHIATRY & NEUROLOGY

## 2025-06-16 PROCEDURE — 83735 ASSAY OF MAGNESIUM: CPT

## 2025-06-16 PROCEDURE — 83970 ASSAY OF PARATHORMONE: CPT | Mod: PARLAB

## 2025-06-16 PROCEDURE — 82330 ASSAY OF CALCIUM: CPT

## 2025-06-16 PROCEDURE — 70551 MRI BRAIN STEM W/O DYE: CPT

## 2025-06-16 PROCEDURE — 82947 ASSAY GLUCOSE BLOOD QUANT: CPT

## 2025-06-16 PROCEDURE — 1200000002 HC GENERAL ROOM WITH TELEMETRY DAILY

## 2025-06-16 PROCEDURE — 2500000001 HC RX 250 WO HCPCS SELF ADMINISTERED DRUGS (ALT 637 FOR MEDICARE OP)

## 2025-06-16 PROCEDURE — 80069 RENAL FUNCTION PANEL: CPT

## 2025-06-16 PROCEDURE — 97166 OT EVAL MOD COMPLEX 45 MIN: CPT | Mod: GO

## 2025-06-16 PROCEDURE — 85027 COMPLETE CBC AUTOMATED: CPT

## 2025-06-16 PROCEDURE — 2500000004 HC RX 250 GENERAL PHARMACY W/ HCPCS (ALT 636 FOR OP/ED): Performed by: INTERNAL MEDICINE

## 2025-06-16 RX ORDER — ACETAMINOPHEN 160 MG/5ML
650 SOLUTION ORAL EVERY 4 HOURS PRN
Status: DISCONTINUED | OUTPATIENT
Start: 2025-06-16 | End: 2025-06-17

## 2025-06-16 RX ORDER — MAGNESIUM SULFATE HEPTAHYDRATE 40 MG/ML
4 INJECTION, SOLUTION INTRAVENOUS ONCE
Status: COMPLETED | OUTPATIENT
Start: 2025-06-16 | End: 2025-06-16

## 2025-06-16 RX ORDER — ACETAMINOPHEN 325 MG/1
650 TABLET ORAL EVERY 4 HOURS PRN
Status: DISCONTINUED | OUTPATIENT
Start: 2025-06-16 | End: 2025-06-16

## 2025-06-16 RX ADMIN — HEPARIN SODIUM 5000 UNITS: 5000 INJECTION, SOLUTION INTRAVENOUS; SUBCUTANEOUS at 06:07

## 2025-06-16 RX ADMIN — FAMOTIDINE 20 MG: 20 TABLET, FILM COATED ORAL at 10:06

## 2025-06-16 RX ADMIN — CEFTRIAXONE 1 G: 1 INJECTION, SOLUTION INTRAVENOUS at 17:47

## 2025-06-16 RX ADMIN — MAGNESIUM SULFATE HEPTAHYDRATE 4 G: 40 INJECTION, SOLUTION INTRAVENOUS at 13:29

## 2025-06-16 RX ADMIN — OXYCODONE HYDROCHLORIDE AND ACETAMINOPHEN 1 TABLET: 5; 325 TABLET ORAL at 10:09

## 2025-06-16 RX ADMIN — DOCUSATE SODIUM 100 MG: 100 CAPSULE, LIQUID FILLED ORAL at 10:06

## 2025-06-16 RX ADMIN — DICLOFENAC SODIUM 2 G: 10 GEL TOPICAL at 10:09

## 2025-06-16 RX ADMIN — HEPARIN SODIUM 5000 UNITS: 5000 INJECTION, SOLUTION INTRAVENOUS; SUBCUTANEOUS at 20:37

## 2025-06-16 RX ADMIN — Medication 50 MCG: at 10:06

## 2025-06-16 RX ADMIN — SODIUM CHLORIDE 75 ML/HR: 0.9 INJECTION, SOLUTION INTRAVENOUS at 16:29

## 2025-06-16 RX ADMIN — DICLOFENAC SODIUM 2 G: 10 GEL TOPICAL at 20:37

## 2025-06-16 RX ADMIN — FENOFIBRATE 160 MG: 160 TABLET ORAL at 10:06

## 2025-06-16 RX ADMIN — ASPIRIN 81 MG: 81 TABLET, COATED ORAL at 10:06

## 2025-06-16 RX ADMIN — HEPARIN SODIUM 5000 UNITS: 5000 INJECTION, SOLUTION INTRAVENOUS; SUBCUTANEOUS at 13:29

## 2025-06-16 RX ADMIN — DONEPEZIL HYDROCHLORIDE 10 MG: 10 TABLET ORAL at 20:37

## 2025-06-16 ASSESSMENT — COGNITIVE AND FUNCTIONAL STATUS - GENERAL
STANDING UP FROM CHAIR USING ARMS: TOTAL
DRESSING REGULAR LOWER BODY CLOTHING: TOTAL
TOILETING: TOTAL
MOVING FROM LYING ON BACK TO SITTING ON SIDE OF FLAT BED WITH BEDRAILS: A LOT
DAILY ACTIVITIY SCORE: 9
MOVING TO AND FROM BED TO CHAIR: TOTAL
PERSONAL GROOMING: A LOT
WALKING IN HOSPITAL ROOM: TOTAL
TURNING FROM BACK TO SIDE WHILE IN FLAT BAD: A LOT
HELP NEEDED FOR BATHING: TOTAL
MOBILITY SCORE: 8
CLIMB 3 TO 5 STEPS WITH RAILING: TOTAL
EATING MEALS: A LITTLE
DRESSING REGULAR UPPER BODY CLOTHING: TOTAL

## 2025-06-16 ASSESSMENT — PAIN SCALES - GENERAL
PAINLEVEL_OUTOF10: 0 - NO PAIN
PAINLEVEL_OUTOF10: 3
PAINLEVEL_OUTOF10: 8

## 2025-06-16 ASSESSMENT — PAIN DESCRIPTION - DESCRIPTORS: DESCRIPTORS: ACHING

## 2025-06-16 ASSESSMENT — PAIN - FUNCTIONAL ASSESSMENT
PAIN_FUNCTIONAL_ASSESSMENT: 0-10
PAIN_FUNCTIONAL_ASSESSMENT: 0-10

## 2025-06-16 ASSESSMENT — VISUAL ACUITY: VA_NORMAL: 1

## 2025-06-16 ASSESSMENT — PAIN DESCRIPTION - ORIENTATION: ORIENTATION: LEFT;RIGHT

## 2025-06-16 ASSESSMENT — ACTIVITIES OF DAILY LIVING (ADL): ADL_ASSISTANCE: NEEDS ASSISTANCE

## 2025-06-16 ASSESSMENT — PAIN DESCRIPTION - LOCATION: LOCATION: KNEE

## 2025-06-16 NOTE — PROGRESS NOTES
06/16/25 1443   Discharge Planning   Living Arrangements Other (Comment)  (from [facility])   Support Systems Family members   Assistance Needed assistance needed   Type of Residence Skilled nursing facility   Do you have animals or pets at home? No   Home or Post Acute Services Post acute facilities (Rehab/SNF/etc)   Type of Post Acute Facility Services Long term care   Expected Discharge Disposition Inter   Does the patient need discharge transport arranged? Yes   RoundTrip coordination needed? Yes   Has discharge transport been arranged? No   Patient Choice   Provider Choice list and CMS website (https://medicare.gov/care-compare#search) for post-acute Quality and Resource Measure Data were provided and reviewed with:   (patient stated that he is from [facility] and is planning to return at discharge)   Patient / Family choosing to utilize agency / facility established prior to hospitalization Yes   Intensity of Service   Intensity of Service 0-30 min     Care transitions at bedside to complete assessment with patient.  TCC introduced self and explained role to patient.  Patient demographics reviewed and verified.  Patient stated that he is from [facility] and wants to return at discharge.  Patient stated that he gets around using a power chair.  Will contact [facility] to place return LTC referral for patient.  Pending PT/OT evals.  Care transitions to follow.    PCP: Ralph Arora  Insurance: [employer]

## 2025-06-16 NOTE — CARE PLAN
The patient's goals for the shift include rest    The clinical goals for the shift include monitor for stroke symptoms    Over the shift, the patient did make progress toward the following goals. Barriers to progression include ams. Recommendations to address these barriers include hourly rounding.

## 2025-06-16 NOTE — CARE PLAN
The patient's goals for the shift include rest    The clinical goals for the shift include monitor for stroke symptoms    Over the shift, the patient did  make progress toward the following goals. Barriers to progression include change in mentation. Recommendations to address these barriers include hourly rounding.
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
## 2025-06-16 NOTE — PROGRESS NOTES
Physical Therapy    Physical Therapy Evaluation    Patient Name: Brandon Garnica  MRN: [id number]  Today's Date: 6/16/2025   Time Calculation  Start Time: 1443  Stop Time: 1500  Time Calculation (min): 17 min  904/904-A    Assessment/Plan   PT Assessment  PT Assessment Results: Decreased strength, Decreased range of motion, Decreased endurance, Impaired balance, Decreased mobility, Decreased safety awareness  Rehab Prognosis: Good  Barriers to Discharge Home: Caregiver assistance, Physical needs  Caregiver Assistance: Caregiver assistance needed per identified barriers - however, level of patient's required assistance exceeds assistance available at home  Physical Needs: 24hr mobility assistance needed  Evaluation/Treatment Tolerance: Patient limited by fatigue  End of Session Communication: Bedside nurse  Assessment Comment: At baseline pt reports assist x1 for pivot transfers to motorized WC, presently assist x2 for seated scoot at eob, unable to perform transfers. Continued skilled PT intervention indicated to facilitate increased strength, balance & functional mobility for return to facility at prior level of asssist  End of Session Patient Position: Bed, 2 rail up, Alarm on  IP OR SWING BED PT PLAN  Inpatient or Swing Bed: Inpatient  PT Plan  Treatment/Interventions: Bed mobility, Transfer training, Balance training, Range of motion, Therapeutic exercise, Therapeutic activity  PT Plan: Ongoing PT  PT Frequency: 3 times per week  PT Discharge Recommendations: Moderate intensity level of continued care  PT Recommended Transfer Status: Total assist  PT - OK to Discharge: Yes (to next level of care when cleared by medical team)    Subjective     Current Problem:  1. Stroke-like symptoms  Transthoracic Echo Complete    Transthoracic Echo Complete    Holter or Event Cardiac Monitor      2. FREEMAN (acute kidney injury)        3. Acute cystitis with hematuria        4. Elevated troponin        5. History of ischemic stroke   Transthoracic Echo Complete    Transthoracic Echo Complete      6. Transient cerebral ischemia, unspecified type  Holter or Event Cardiac Monitor        Problem List[1]    General Visit Information:  General  Reason for Referral: PT eval & treat/impaired mobility DX: UTI, r/o cva, acute metabolic encephalopathy, dionicio; 6/15/25 increased rt hemiparesis, clammy/lethargy; hypotensive BP 80/52; ct head (-) acute; mri: Tiny focus increased signal on the diffusion-weighted sequence to the  right of midline in the lower cornel with decreased signal on the ADC  map, possible recent tiny infarction. No acute intracranial  hemorrhage. Neuro on consult  Referred By: Lorenzo Rodriguez  Caregiver Feedback: Per conference w/ RN patient stable to participate in therapy  Co-Treatment: OT  Co-Treatment Reason: to maximize patient safety & mobility  Patient Position Received: Bed, 2 rail up, Alarm on  General Comment: Pleasant & cooperative, receptive to mobility& instructions; flat affect    Home Living:  Home Living  Home Living Comments: [id number], accessible environment    Prior Level of Function:  Prior Function Per Pt/Caregiver Report  Prior Function Comments: reports independent bed mobility, assist x1 stand pivot transfers w/o device, independent mobilizing w/ power WC; reports independent upper body dressing/bathing,feeding & grooming after set up; reports assist for lower body dressing/bathing    Precautions:  Precautions  Precautions Comment: fall, alarm, telemetry, IV, purewick, colostomy, NPO, aspiration  Pain:  Pain Assessment  Pain Assessment:  (notes generalized arthritic pain, not rated, positioned for comfort at end of session)    Cognition:  Cognition  Overall Cognitive Status: Within Functional Limits  Processing Speed: Delayed    General Assessments:      Activity Tolerance  Endurance: Decreased tolerance for upright activites  Sensation  Light Touch: No apparent deficits      Static Sitting Balance  Static  Sitting-Balance Support:  (cga sitting edge of bed, leans rt & extends lle , repeated verbal & tactile cues for lle placement/foot to floor & rue placement hand to bed)   Functional Assessments:     Bed Mobility  Bed Mobility:  (max assist x1 supine>sit, max assist x2 sit>supine w/ assist to manage trunk &le's)  Transfers  Transfer:  (performed seated scoot 3x lt toward hob w/ mod assist x2, unable /unsafe to attempt standing due to decreased endurance & decreased strength)  Ambulation/Gait Training  Ambulation/Gait Training Performed:  (nonambulatory at baseline)   Extremity/Trunk Assessments:        RLE   RLE :  (end rom tightness heelcord & hamstrings, knee extension minus ~20deg, ankle df minus ~20deg; strength in limited rom: hip flexion 3+/5 knee ext 3+/5 ankle df 3+/5)  LLE   LLE :  (end rom tightness heelcord & hamstrings, knee extension minus ~10deg, ankle df to ~neutral; strength : hip flexion 2/5 knee ext 3+/5 ankle df 3+/5)    Outcome Measures:     [facility] Basic Mobility  Turning from your back to your side while in a flat bed without using bedrails: A lot  Moving from lying on your back to sitting on the side of a flat bed without using bedrails: A lot  Moving to and from bed to chair (including a wheelchair): Total  Standing up from a chair using your arms (e.g. wheelchair or bedside chair): Total  To walk in hospital room: Total  Climbing 3-5 steps with railing: Total  Basic Mobility - Total Score: 8     Goals:  Encounter Problems       Encounter Problems (Active)       PT Problem       STG - Pt will transition supine <> sitting with min assist x1  (Progressing)       Start:  06/16/25    Expected End:  06/30/25            STG - Pt will SPT bed <> chair with min assist x1  (Progressing)       Start:  06/16/25    Expected End:  06/30/25            STG - Pt will maintain sitting supported static balance >=10minutes with supervision (Progressing)       Start:  06/16/25    Expected End:  06/30/25             STG - Pt will perform 2-3 sets of BLE therex x10 to maximize functional strength and independence  (Progressing)       Start:  06/16/25    Expected End:  06/30/25               Pain - Adult            Education Documentation  Mobility Training, taught by Vanessa Meyers PT at 6/16/2025  4:01 PM.  Learner: Patient  Readiness: Acceptance  Method: Explanation  Response: Verbalizes Understanding  Comment: safety, activity progression            [1]   Patient Active Problem List  Diagnosis    Stroke-like symptoms    Weakness

## 2025-06-16 NOTE — CARE PLAN
Notified by RN that patient has passed bedside swallow assessment. Patient was previously NPO, will order cardiac diet at this time.

## 2025-06-17 ENCOUNTER — APPOINTMENT (OUTPATIENT)
Dept: CARDIOLOGY | Facility: HOSPITAL | Age: 75
DRG: 689 | End: 2025-06-17
Payer: MEDICARE

## 2025-06-17 ENCOUNTER — APPOINTMENT (OUTPATIENT)
Dept: RADIOLOGY | Facility: HOSPITAL | Age: 75
DRG: 689 | End: 2025-06-17
Payer: MEDICARE

## 2025-06-17 PROBLEM — I63.9 ACUTE CVA (CEREBROVASCULAR ACCIDENT) (MULTI): Status: ACTIVE | Noted: 2025-06-17

## 2025-06-17 LAB
ANION GAP SERPL CALC-SCNC: 11 MMOL/L (ref 10–20)
APPEARANCE UR: ABNORMAL
BACTERIA #/AREA URNS AUTO: ABNORMAL /HPF
BILIRUB UR STRIP.AUTO-MCNC: NEGATIVE MG/DL
BUN SERPL-MCNC: 27 MG/DL (ref 6–23)
CALCIUM SERPL-MCNC: 8.3 MG/DL (ref 8.6–10.3)
CARDIAC TROPONIN I PNL SERPL HS: 184 NG/L (ref 0–20)
CHLORIDE SERPL-SCNC: 107 MMOL/L (ref 98–107)
CO2 SERPL-SCNC: 22 MMOL/L (ref 21–32)
COLOR UR: YELLOW
CREAT SERPL-MCNC: 2.49 MG/DL (ref 0.5–1.3)
EGFRCR SERPLBLD CKD-EPI 2021: 26 ML/MIN/1.73M*2
ERYTHROCYTE [DISTWIDTH] IN BLOOD BY AUTOMATED COUNT: 16.1 % (ref 11.5–14.5)
GLUCOSE BLD MANUAL STRIP-MCNC: 110 MG/DL (ref 74–99)
GLUCOSE BLD MANUAL STRIP-MCNC: 116 MG/DL (ref 74–99)
GLUCOSE BLD MANUAL STRIP-MCNC: 127 MG/DL (ref 74–99)
GLUCOSE BLD MANUAL STRIP-MCNC: 166 MG/DL (ref 74–99)
GLUCOSE SERPL-MCNC: 115 MG/DL (ref 74–99)
GLUCOSE UR STRIP.AUTO-MCNC: NORMAL MG/DL
HCT VFR BLD AUTO: 28.1 % (ref 41–52)
HGB BLD-MCNC: 9.2 G/DL (ref 13.5–17.5)
KETONES UR STRIP.AUTO-MCNC: NEGATIVE MG/DL
LEUKOCYTE ESTERASE UR QL STRIP.AUTO: ABNORMAL
MCH RBC QN AUTO: 23.4 PG (ref 26–34)
MCHC RBC AUTO-ENTMCNC: 32.7 G/DL (ref 32–36)
MCV RBC AUTO: 72 FL (ref 80–100)
MUCOUS THREADS #/AREA URNS AUTO: ABNORMAL /LPF
NITRITE UR QL STRIP.AUTO: NEGATIVE
NRBC BLD-RTO: 0 /100 WBCS (ref 0–0)
PH UR STRIP.AUTO: 6 [PH]
PLATELET # BLD AUTO: 185 X10*3/UL (ref 150–450)
POTASSIUM SERPL-SCNC: 4.3 MMOL/L (ref 3.5–5.3)
PROT UR STRIP.AUTO-MCNC: ABNORMAL MG/DL
RBC # BLD AUTO: 3.93 X10*6/UL (ref 4.5–5.9)
RBC # UR STRIP.AUTO: ABNORMAL MG/DL
RBC #/AREA URNS AUTO: >20 /HPF
SODIUM SERPL-SCNC: 136 MMOL/L (ref 136–145)
SP GR UR STRIP.AUTO: 1.02
UROBILINOGEN UR STRIP.AUTO-MCNC: NORMAL MG/DL
WBC # BLD AUTO: 10.6 X10*3/UL (ref 4.4–11.3)
WBC #/AREA URNS AUTO: >50 /HPF
WBC CLUMPS #/AREA URNS AUTO: ABNORMAL /HPF

## 2025-06-17 PROCEDURE — 2500000002 HC RX 250 W HCPCS SELF ADMINISTERED DRUGS (ALT 637 FOR MEDICARE OP, ALT 636 FOR OP/ED): Performed by: NURSE PRACTITIONER

## 2025-06-17 PROCEDURE — 71045 X-RAY EXAM CHEST 1 VIEW: CPT | Performed by: RADIOLOGY

## 2025-06-17 PROCEDURE — 85027 COMPLETE CBC AUTOMATED: CPT | Performed by: INTERNAL MEDICINE

## 2025-06-17 PROCEDURE — 2500000004 HC RX 250 GENERAL PHARMACY W/ HCPCS (ALT 636 FOR OP/ED): Performed by: INTERNAL MEDICINE

## 2025-06-17 PROCEDURE — 2500000001 HC RX 250 WO HCPCS SELF ADMINISTERED DRUGS (ALT 637 FOR MEDICARE OP): Performed by: NURSE PRACTITIONER

## 2025-06-17 PROCEDURE — 87086 URINE CULTURE/COLONY COUNT: CPT | Mod: PARLAB | Performed by: INTERNAL MEDICINE

## 2025-06-17 PROCEDURE — 81001 URINALYSIS AUTO W/SCOPE: CPT | Performed by: INTERNAL MEDICINE

## 2025-06-17 PROCEDURE — 80048 BASIC METABOLIC PNL TOTAL CA: CPT | Performed by: INTERNAL MEDICINE

## 2025-06-17 PROCEDURE — 36415 COLL VENOUS BLD VENIPUNCTURE: CPT | Performed by: INTERNAL MEDICINE

## 2025-06-17 PROCEDURE — 82947 ASSAY GLUCOSE BLOOD QUANT: CPT

## 2025-06-17 PROCEDURE — 2500000001 HC RX 250 WO HCPCS SELF ADMINISTERED DRUGS (ALT 637 FOR MEDICARE OP)

## 2025-06-17 PROCEDURE — 2500000004 HC RX 250 GENERAL PHARMACY W/ HCPCS (ALT 636 FOR OP/ED): Performed by: NURSE PRACTITIONER

## 2025-06-17 PROCEDURE — 99232 SBSQ HOSP IP/OBS MODERATE 35: CPT | Performed by: NURSE PRACTITIONER

## 2025-06-17 PROCEDURE — 84484 ASSAY OF TROPONIN QUANT: CPT | Performed by: NURSE PRACTITIONER

## 2025-06-17 PROCEDURE — 71045 X-RAY EXAM CHEST 1 VIEW: CPT

## 2025-06-17 PROCEDURE — 2500000004 HC RX 250 GENERAL PHARMACY W/ HCPCS (ALT 636 FOR OP/ED)

## 2025-06-17 PROCEDURE — 1200000002 HC GENERAL ROOM WITH TELEMETRY DAILY

## 2025-06-17 RX ORDER — CLOPIDOGREL BISULFATE 75 MG/1
75 TABLET ORAL DAILY
Status: DISCONTINUED | OUTPATIENT
Start: 2025-06-17 | End: 2025-06-19 | Stop reason: HOSPADM

## 2025-06-17 RX ORDER — METOPROLOL TARTRATE 1 MG/ML
5 INJECTION, SOLUTION INTRAVENOUS ONCE
Status: COMPLETED | OUTPATIENT
Start: 2025-06-17 | End: 2025-06-17

## 2025-06-17 RX ORDER — ACETAMINOPHEN 325 MG/1
650 TABLET ORAL ONCE
Status: COMPLETED | OUTPATIENT
Start: 2025-06-17 | End: 2025-06-17

## 2025-06-17 RX ORDER — SODIUM CHLORIDE 9 MG/ML
75 INJECTION, SOLUTION INTRAVENOUS CONTINUOUS
Status: ACTIVE | OUTPATIENT
Start: 2025-06-17 | End: 2025-06-18

## 2025-06-17 RX ORDER — ROSUVASTATIN CALCIUM 10 MG/1
10 TABLET, COATED ORAL NIGHTLY
Status: DISCONTINUED | OUTPATIENT
Start: 2025-06-17 | End: 2025-06-19 | Stop reason: HOSPADM

## 2025-06-17 RX ADMIN — FAMOTIDINE 20 MG: 20 TABLET, FILM COATED ORAL at 08:24

## 2025-06-17 RX ADMIN — METOPROLOL TARTRATE 5 MG: 5 INJECTION INTRAVENOUS at 00:56

## 2025-06-17 RX ADMIN — ACETAMINOPHEN 650 MG: 325 TABLET ORAL at 01:03

## 2025-06-17 RX ADMIN — DICLOFENAC SODIUM 2 G: 10 GEL TOPICAL at 08:23

## 2025-06-17 RX ADMIN — METOPROLOL TARTRATE 75 MG: 50 TABLET, FILM COATED ORAL at 08:23

## 2025-06-17 RX ADMIN — ASPIRIN 81 MG: 81 TABLET, COATED ORAL at 08:24

## 2025-06-17 RX ADMIN — HEPARIN SODIUM 5000 UNITS: 5000 INJECTION, SOLUTION INTRAVENOUS; SUBCUTANEOUS at 05:19

## 2025-06-17 RX ADMIN — CLOPIDOGREL BISULFATE 75 MG: 75 TABLET, FILM COATED ORAL at 16:08

## 2025-06-17 RX ADMIN — DOCUSATE SODIUM 100 MG: 100 CAPSULE, LIQUID FILLED ORAL at 08:24

## 2025-06-17 RX ADMIN — Medication 50 MCG: at 08:24

## 2025-06-17 RX ADMIN — DONEPEZIL HYDROCHLORIDE 10 MG: 10 TABLET ORAL at 22:12

## 2025-06-17 RX ADMIN — HEPARIN SODIUM 5000 UNITS: 5000 INJECTION, SOLUTION INTRAVENOUS; SUBCUTANEOUS at 13:48

## 2025-06-17 RX ADMIN — CEFTRIAXONE 1 G: 1 INJECTION, SOLUTION INTRAVENOUS at 16:08

## 2025-06-17 RX ADMIN — FENOFIBRATE 160 MG: 160 TABLET ORAL at 08:23

## 2025-06-17 RX ADMIN — ROSUVASTATIN CALCIUM 10 MG: 10 TABLET, FILM COATED ORAL at 22:12

## 2025-06-17 RX ADMIN — METOPROLOL TARTRATE 75 MG: 50 TABLET, FILM COATED ORAL at 22:12

## 2025-06-17 RX ADMIN — SODIUM CHLORIDE 75 ML/HR: 0.9 INJECTION, SOLUTION INTRAVENOUS at 08:38

## 2025-06-17 RX ADMIN — DICLOFENAC SODIUM 2 G: 10 GEL TOPICAL at 22:12

## 2025-06-17 RX ADMIN — HEPARIN SODIUM 5000 UNITS: 5000 INJECTION, SOLUTION INTRAVENOUS; SUBCUTANEOUS at 22:12

## 2025-06-17 ASSESSMENT — COGNITIVE AND FUNCTIONAL STATUS - GENERAL
MOVING TO AND FROM BED TO CHAIR: A LOT
CLIMB 3 TO 5 STEPS WITH RAILING: A LOT
WALKING IN HOSPITAL ROOM: A LOT
TURNING FROM BACK TO SIDE WHILE IN FLAT BAD: A LITTLE
HELP NEEDED FOR BATHING: A LOT
DAILY ACTIVITIY SCORE: 16
DRESSING REGULAR LOWER BODY CLOTHING: A LITTLE
TOILETING: A LOT
STANDING UP FROM CHAIR USING ARMS: A LOT
MOVING FROM LYING ON BACK TO SITTING ON SIDE OF FLAT BED WITH BEDRAILS: A LITTLE
EATING MEALS: A LITTLE
PERSONAL GROOMING: A LITTLE
DRESSING REGULAR UPPER BODY CLOTHING: A LITTLE
MOBILITY SCORE: 14

## 2025-06-17 ASSESSMENT — PAIN SCALES - GENERAL: PAINLEVEL_OUTOF10: 0 - NO PAIN

## 2025-06-17 NOTE — CARE PLAN
The patient's goals for the shift include rest    The clinical goals for the shift include maintain safety and comfort    Over the shift, the patient is going to be monitored by vital signs

## 2025-06-17 NOTE — CONSULTS
"Inpatient consult to Neurology  Consult performed by: Kim Bonilla, APRN-CNP  Consult ordered by: Elyse H Klerman, MD          Subjective   Echocardiogram pending completion.  If unremarkable, general neurology to sign off on care as CVA care path is complete.    Last Recorded Vitals  Blood pressure 144/71, pulse 79, temperature 36 °C (96.8 °F), temperature source Temporal, resp. rate 18, height 1.803 m (5' 10.98\"), weight 85.5 kg (188 lb 7.9 oz), SpO2 93%.    Relevant Results  Scheduled medications  Scheduled Medications[1]  Continuous medications  Continuous Medications[2]  PRN medications  PRN Medications[3]  XR chest 1 view  Result Date: 6/17/2025  Interpreted By:  Florence Johnson, STUDY: XR CHEST 1 VIEW;  6/17/2025 12:24 am   INDICATION: Signs/Symptoms:reported decreased spo2     COMPARISON: Chest x-ray 06/15/2025. CT abdomen and pelvis 06/15/2025.   ACCESSION NUMBER(S): [id number]   ORDERING CLINICIAN: HELEN ALDRIDGE   TECHNIQUE: Portable upright frontal view of the chest was obtained .   FINDINGS: Monitoring leads and radiopaque tubing are overlying the patient.   The cardiomediastinal silhouette is within normal limits.   There is mild bibasilar atelectasis. No pleural effusion or pneumothorax.         1.  Mild bibasilar atelectasis.       MACRO: None.   Signed by: Florence Johnson 6/17/2025 12:51 AM Dictation workstation:   [id number]    MR brain wo IV contrast  Result Date: 6/16/2025  Interpreted By:  Kemar Serna, STUDY: MR BRAIN WO IV CONTRAST;  6/16/2025 12:15 pm   INDICATION: Signs/Symptoms:R/o stroke.     COMPARISON: CT head and CTA head yesterday. MRI brain 02/04/2018.   ACCESSION NUMBER(S): [id number]   ORDERING CLINICIAN: ELYSE KLERMAN   TECHNIQUE: Axial T2, FLAIR, DWI, gradient echo T2 and sagittal and coronal T1 weighted images of brain were acquired.   FINDINGS: Tiny focus increased signal on the diffusion-weighted sequence to the right of midline in the lower cornel with decreased " signal on the ADC map, possible recent tiny infarction. No acute intracranial hemorrhage.     No extra-axial fluid collection. No intracranial mass. Major vascular flow voids intact.   Multiple old infarctions include bilateral cerebellar hemispheres, the cornel, left thalamus, right caudate body and left lentiform nucleus.   Mild to moderate white matter FLAIR signal increase, most commonly seen with chronic small vessel ischemic change.  Presence of older small infarctions in these areas not excluded.   Mild global brain parenchymal volume loss.   Peripheral mucosal inflammatory changes left maxillary sinus. No significant nonspecific mastoid fluid. Orbital contents unremarkable. No destructive osseous lesions identified.       1. Tiny focus increased signal on the diffusion-weighted sequence to the right of midline in the lower cornel with decreased signal on the ADC map, possible recent tiny infarction. 2. Mild to moderate white matter FLAIR signal increase, most commonly seen with chronic small vessel ischemic change.  Presence of older small infarctions in these areas not excluded. 3. Mild global brain parenchymal volume loss. 4. Multiple old supratentorial and infratentorial infarctions.   MACRO: None   Signed by: Kemar Serna 6/16/2025 1:13 PM Dictation workstation:   [id number]    ECG 12 lead  Result Date: 6/16/2025  Sinus rhythm Minimal ST elevation, inferior leads    CT abdomen pelvis wo IV contrast  Result Date: 6/15/2025  STUDY: CT Abdomen and Pelvis without IV Contrast; 6/15/2025 at 7:07 p.m. INDICATION: Hemorrhagic UTI. COMPARISON: None Available. ACCESSION NUMBER(S): [id number] ORDERING CLINICIAN: ELYSE H KLERMAN TECHNIQUE: CT of the abdomen and pelvis was performed.  Contiguous axial images were obtained at 3 mm slice thickness through the abdomen and pelvis. Coronal and sagittal reconstructions at 3 mm slice thickness were performed. No intravenous contrast was administered.  Automated mA/kV  exposure control was utilized and patient examination was performed in strict accordance with principles of ALARA. FINDINGS: Please note that the evaluation of vessels, lymph nodes and organs is limited without intravenous contrast.  LOWER CHEST: Cardiac size is enlarged with mild calcified coronary plaque.  No pericardial effusion.  Mild calcified plaque is seen in the included descending thoracic aorta.  Elevated right hemidiaphragm is noted with atelectasis in the lung bases and slightly diminished inspiration. There is a small sliding-type hiatal hernia.   ABDOMEN:  LIVER: No hepatomegaly.  Smooth surface contour.  Normal attenuation.  BILE DUCTS: No intrahepatic or extrahepatic biliary ductal dilatation.  GALLBLADDER: Gallbladder contains a small amount of sludge but appears otherwise unremarkable.  STOMACH: No abnormalities identified.  PANCREAS: There is mild fatty atrophy of the pancreas.  No masses or ductal dilatation.  SPLEEN: No splenomegaly or focal splenic lesion.  ADRENAL GLANDS: No thickening or nodules.  KIDNEYS AND URETERS: Mild renal cortical thinning and perinephric stranding is seen bilaterally.  No renal or ureteral calculi.  PELVIS:  BLADDER: Urinary bladder demonstrates excreted contrast.  There is a large diverticulum arising from the anterior aspect of the dome of the urinary bladder measuring up to 5.5 cm in diameter.  REPRODUCTIVE ORGANS: Prostate is upper normal in size.  BOWEL: Appendix is not definitively identified.  Terminal ileum is unremarkable.  Diverticulosis is present throughout the length of the colon.  There is a left mid abdominal descending colostomy with a Osman's pouch in the pelvis.  VESSELS: Mild calcified plaque is present in the abdominal aorta and iliac arteries.   PERITONEUM/RETROPERITONEUM/LYMPH NODES: No free fluid.  No pneumoperitoneum. No lymphadenopathy.  ABDOMINAL WALL: Small fat-containing umbilical hernia is noted. SOFT TISSUES: No abnormalities  identified.  BONES: Beam hardening artifact from the left hip hardware limits visualization in the lower pelvis.  No acute fracture or aggressive osseous lesion. There is a mild dextroconvex curvature of the lumbar spine.  Bridging osteophytes are seen throughout the spine.  Severe disc disease and chronic osseous fusion of the L3-4 vertebral bodies is noted with large anterior osteophyte.  There is osseous fusion of the L3-4 and L4-5 facets with moderate to severe facet arthrosis in the remaining lumbar facets.  Severe narrowing is seen of the left L3-4 neural foramen with moderate narrowing of the left L4-5 neural foramen. There is severe narrowing of the right L3-4 and L4-5 neural foramen. Left total hip arthroplasty is noted.    No definite acute intra-abdominal pathology identified. Large urinary bladder diverticulum. Cardiomegaly with mild coronary atherosclerosis. Extensive colonic diverticulosis with a left mid abdominal descending colostomy and Osman's pouch in the pelvis. Severe multilevel disc disease in the lumbar spine with severe narrowing of the bilateral L3-4 and right L4-5 neural foramen with osseous fusion of the L3-4 vertebrae. Signed by Ángel Eisenberg    CT brain attack head wo IV contrast  Result Date: 6/15/2025  Interpreted By:  Ubaldo Lion, STUDY: CT BRAIN ATTACK HEAD WO IV CONTRAST;  6/15/2025 3:44 pm   INDICATION: Signs/Symptoms:Stroke Evaluation.     COMPARISON: Correlation with MRI of the brain 02/04/2018, and CT head 02/03/2018   ACCESSION NUMBER(S): [id number]   ORDERING CLINICIAN: ELYSE KLERMAN   TECHNIQUE: Noncontrast axial CT scan of head was performed. Angled reformats in brain and bone windows were generated. The images were reviewed in bone, brain, blood and soft tissue windows.   FINDINGS: No acute hemorrhage or edema. 3.3 cm area of encephalomalacia of the right occipital lobe, identical to prior examination.   Miniscule low-attenuation area within the cornel (Series 201,  Image 12) similar prior examination likely chronic, 2018 (series 201, Image 10)   Encephalomalacia of the right cerebellar lobe axial image 12, stable.   There is chronic sinus disease left maxillary sinus appearing identical to 2018.       Chronic findings similar to prior examination 2018. No new findings or acute process.   Results sent to the patient's clinician via epic message at 4:57 p.m. 06/15/2025.   MACRO: Ubaldo Lion discussed the significance and urgency of this critical finding by EPIC secure chat with  ELYSE KLERMAN on 6/15/2025 at 4:59 pm.  (**-RCF-**) Findings:  See findings.     Signed by: Ubaldo Lion 6/15/2025 5:01 PM Dictation workstation:   [id number]    XR chest 1 view  Result Date: 6/15/2025  STUDY: Chest Radiograph;  06/15/2025 4:15 PM INDICATION: Stroke. COMPARISON: None available. ACCESSION NUMBER(S): [id number] ORDERING CLINICIAN: ELYSE H KLERMAN TECHNIQUE:  Frontal chest was obtained at 16:15 hours. FINDINGS: CARDIOMEDIASTINAL SILHOUETTE: Cardiomediastinal silhouette is normal in size and configuration.  LUNGS: Lungs are clear.  ABDOMEN: No remarkable upper abdominal findings.  BONES: No acute osseous changes.    No acute pulmonary pathology. Signed by Munir Damian MD    CT brain attack angio head and neck W and WO IV contrast  Result Date: 6/15/2025  Interpreted By:  Gunnar Nguyễn, STUDY: CT BRAIN ATTACK ANGIO HEAD AND NECK W AND WO IV CONTRAST;  6/15/2025 3:51 pm   INDICATION: Signs/Symptoms:stroke.     COMPARISON: 02/04/2018 MRI and MRA and 06/15/2025 head CT.   ACCESSION NUMBER(S): [id number]   ORDERING CLINICIAN: ELYSE KLERMAN   TECHNIQUE: 75 ML of Omnipaque 350 was administered intravenously and axial images of the head and neck were acquired.  Coronal, sagittal, and 3-D reconstructions were provided for review.   FINDINGS: Findings CT angiography neck: Origins of the great vessels are grossly unremarkable. Common carotid arteries carotid bifurcations and cervical  internal carotid arteries are patent in the region of the neck. The proximal right vertebral artery is occluded with partial reconstitution at approximately the C6 level similar to the prior MRA of the neck. Moderate stenosis of the distal right V2 segment with V3 segment increasing caliber probably due to retrograde filling. There is small stent or dense atherosclerotic calcification surrounding the origin of the left vertebral artery which demonstrates moderate luminal contour irregularity due to prominent atherosclerotic calcification the lower neck for instance on image 239 series 402 with approximately 80% stenosis by NASCET criteria in this region of uncertain chronicity with additional focal stenosis of the left V2 vertebral artery on image 377 series 2 measuring at least 40% with partial distal reconstitution via muscular branches.   Findings CT angiogram head: Evaluation is at least minimally limited due to venous contamination. Distal internal carotid arteries are patent and branch appropriately into the anterior and middle cerebral arteries without evidence of hemodynamically significant stenosis or other abnormality identified. The proximal anterior and middle cerebral arteries are patent. The distal vertebral arteries are patent and join appropriately to form the basilar artery with the typical distal branching pattern and with proximal patency of the posterior cerebral arteries. Small bilateral posterior communicating arteries are noted.       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 Relatively high-grade stenosis of the proximal left vertebral artery measuring approximately 80% by NASCET criteria of uncertain chronicity with additional focal stenosis of the left vertebral artery of uncertain chronicity with respect to the proximal stenosis as the proximal vertebral artery was not included in the field of view on the prior MRA of 02/04/2018. No  definite acute intracranial large vascular occlusion. Given multifocal chronic appearing infarcts in the posterior circulation if acute on chronic ischemia were clinically suspected MRI would be more sensitive and specific in this regard.   MACRO: None   Signed by: Gunnar Nguyễn 6/15/2025 4:32 PM Dictation workstation:   [id number]    CT head wo IV contrast  Result Date: 6/15/2025  * * *Final Report* * * DATE OF EXAM: Surendra 15 2025  3:10PM   MUC   0502  -  CT BRAIN ATTACK WO IVCON  / ACCESSION #  [id number] PROCEDURE REASON: Focal neuro deficit, new, fixed, or worsening, < 4.5 hours, stroke suspected      * * * * Physician Interpretation * * * *  EXAMINATION: CT BRAIN ATTACK WO IVCON CLINICAL HISTORY: Altered mental status. Brain attack. TECHNIQUE: Routine CT of the brain without IV contrast. MQ: CTBA_4 CT Radiation dose: Integrated CT Dose-Length Product (DLP) for this visit =  789.65 mGy*cm CT Dose Reduction Employed: No dose reduction techniques were required COMPARISON: None. RESULT: Acute ischemic change: None. No CT evidence of a new large territorial infarct. Hemorrhage: No evidence of acute intracranial hemorrhage. ECASS hemorrhagic transformation score: Not Applicable Mass Lesion / Mass Effect: There is no evidence of an intracranial mass or extraaxial fluid collection.   No significant mass effect. Chronic change: Scattered patchy foci of low attenuation are present within the supratentorial white matter, a nonspecific finding that most commonly represents mild small vessel disease. Remote encephalomalacia/gliosis involving the medial right occipital lobe. Remote encephalomalacia/gliosis and lacunar infarcts involving the bilateral cerebellar hemispheres. Remote lacunar infarcts in left lentiform loss. Parenchyma: There is mild generalized volume loss for age.  The brain parenchyma is otherwise within normal limits for age. Ventricles: Ventriculomegaly concordant with the degree of parenchymal volume loss. Ex  vacuo dilatation of the right occipital horn and fourth ventricle. Other: The visualized calvarium, skull base, orbits and extracranial soft tissues are normal. Localizer images: Not obtained, mobile stroke unit.    IMPRESSION: No acute intracranial abnormality such as hemorrhage or mass effect. Chronic changes as detailed. No CT evidence of a new large territorial infarct. Please note that MRI is more sensitive in the evaluation of acute ischemic stroke and could be obtained if clinically deemed appropriate. CRITICAL TEST/RESULTS: Notification initiated at 6/15/2025 3:10 PM.   Communicated with Rocío Perez MD on 6/15/2025 3:15 PM . CR_1 : VINNY   Transcribe Date/Time: Surendra 15 2025  3:11P Dictated by : BO LONG MD This examination was interpreted and the report reviewed and electronically signed by: BO LONG MD on Surendra 15 2025  3:19PM  EST    Results for orders placed or performed during the hospital encounter of 06/15/25 (from the past 24 hours)   POCT GLUCOSE   Result Value Ref Range    POCT Glucose 130 (H) 74 - 99 mg/dL   POCT GLUCOSE   Result Value Ref Range    POCT Glucose 160 (H) 74 - 99 mg/dL   CBC   Result Value Ref Range    WBC 10.6 4.4 - 11.3 x10*3/uL    nRBC 0.0 0.0 - 0.0 /100 WBCs    RBC 3.93 (L) 4.50 - 5.90 x10*6/uL    Hemoglobin 9.2 (L) 13.5 - 17.5 g/dL    Hematocrit 28.1 (L) 41.0 - 52.0 %    MCV 72 (L) 80 - 100 fL    MCH 23.4 (L) 26.0 - 34.0 pg    MCHC 32.7 32.0 - 36.0 g/dL    RDW 16.1 (H) 11.5 - 14.5 %    Platelets 185 150 - 450 x10*3/uL   Basic Metabolic Panel   Result Value Ref Range    Glucose 115 (H) 74 - 99 mg/dL    Sodium 136 136 - 145 mmol/L    Potassium 4.3 3.5 - 5.3 mmol/L    Chloride 107 98 - 107 mmol/L    Bicarbonate 22 21 - 32 mmol/L    Anion Gap 11 10 - 20 mmol/L    Urea Nitrogen 27 (H) 6 - 23 mg/dL    Creatinine 2.49 (H) 0.50 - 1.30 mg/dL    eGFR 26 (L) >60 mL/min/1.73m*2    Calcium 8.3 (L) 8.6 - 10.3 mg/dL   Troponin I, High Sensitivity   Result Value Ref Range     Troponin I, High Sensitivity 184 (HH) 0 - 20 ng/L   POCT GLUCOSE   Result Value Ref Range    POCT Glucose 110 (H) 74 - 99 mg/dL   POCT GLUCOSE   Result Value Ref Range    POCT Glucose 116 (H) 74 - 99 mg/dL   Urinalysis with Reflex Culture and Microscopic   Result Value Ref Range    Color, Urine Yellow Light-Yellow, Yellow, Dark-Yellow    Appearance, Urine Turbid (N) Clear    Specific Gravity, Urine 1.016 1.005 - 1.035    pH, Urine 6.0 5.0, 5.5, 6.0, 6.5, 7.0, 7.5, 8.0    Protein, Urine 70 (1+) (A) NEGATIVE, 10 (TRACE), 20 (TRACE) mg/dL    Glucose, Urine Normal Normal mg/dL    Blood, Urine 0.2 (2+) (A) NEGATIVE mg/dL    Ketones, Urine NEGATIVE NEGATIVE mg/dL    Bilirubin, Urine NEGATIVE NEGATIVE mg/dL    Urobilinogen, Urine Normal Normal mg/dL    Nitrite, Urine NEGATIVE NEGATIVE    Leukocyte Esterase, Urine 500 Khalif/uL (A) NEGATIVE   Microscopic Only, Urine   Result Value Ref Range    WBC, Urine >50 (A) 1-5, NONE /HPF    WBC Clumps, Urine MANY Reference range not established. /HPF    RBC, Urine >20 (A) NONE, 1-2, 3-5 /HPF    Bacteria, Urine 1+ (A) NONE SEEN /HPF    Mucus, Urine FEW Reference range not established. /LPF             NIH Stroke Scale  1A. Level of Consciousness: Alert, Keenly Responsive  1B. Ask Month and Age: Both Questions Right  1C. Blink Eyes & Squeeze Hands: Performs Both Tasks  2. Best Gaze: Normal  3. Visual: No Visual Loss  4. Facial Palsy: Partial Paralysis  5A. Motor - Left Arm: No Drift  5B. Motor - Right Arm: No Drift  6A. Motor - Left Leg: Drift  6B. Motor - Right Leg: Drift  7. Limb Ataxia: Absent  8. Sensory Loss: Normal  9. Best Language: Mild-to-Moderate Aphasia  10. Dysarthria: Normal  11. Extinction and Inattention: No Abnormality  NIH Stroke Scale: 5           Dariel Coma Scale  Best Eye Response: Spontaneous  Best Verbal Response: Oriented  Best Motor Response: Follows commands  Dariel Coma Scale Score: 15                 I have personally reviewed the following imaging results:    Imaging  XR chest 1 view  Result Date: 6/17/2025  1.  Mild bibasilar atelectasis.       MACRO: None.   Signed by: Florence Johnson 6/17/2025 12:51 AM Dictation workstation:   [id number]    MR brain wo IV contrast  Result Date: 6/16/2025  1. Tiny focus increased signal on the diffusion-weighted sequence to the right of midline in the lower cornel with decreased signal on the ADC map, possible recent tiny infarction. 2. Mild to moderate white matter FLAIR signal increase, most commonly seen with chronic small vessel ischemic change.  Presence of older small infarctions in these areas not excluded. 3. Mild global brain parenchymal volume loss. 4. Multiple old supratentorial and infratentorial infarctions.   MACRO: None   Signed by: Kemar Serna 6/16/2025 1:13 PM Dictation workstation:   [id number]    CT abdomen pelvis wo IV contrast  Result Date: 6/15/2025  No definite acute intra-abdominal pathology identified. Large urinary bladder diverticulum. Cardiomegaly with mild coronary atherosclerosis. Extensive colonic diverticulosis with a left mid abdominal descending colostomy and Osman's pouch in the pelvis. Severe multilevel disc disease in the lumbar spine with severe narrowing of the bilateral L3-4 and right L4-5 neural foramen with osseous fusion of the L3-4 vertebrae. Signed by Ángel Eisenebrg    CT brain attack head wo IV contrast  Result Date: 6/15/2025  Chronic findings similar to prior examination 2018. No new findings or acute process.   Results sent to the patient's clinician via epic message at 4:57 p.m. 06/15/2025.   MACRO: Ubaldo Lion discussed the significance and urgency of this critical finding by EPIC secure chat with  ELYSE KLERMAN on 6/15/2025 at 4:59 pm.  (**-RCF-**) Findings:  See findings.     Signed by: Ubaldo Lion 6/15/2025 5:01 PM Dictation workstation:   [id number]    XR chest 1 view  Result Date: 6/15/2025  No acute pulmonary pathology. Signed by Munir Damian MD    CT brain  attack angio head and neck W and WO IV contrast  Result Date: 6/15/2025  Occluded proximal right vertebral artery similar to the MRA 02/04/2018 with distal reconstitution. Relatively high-grade stenosis of the proximal left vertebral artery measuring approximately 80% by NASCET criteria of uncertain chronicity with additional focal stenosis of the left vertebral artery of uncertain chronicity with respect to the proximal stenosis as the proximal vertebral artery was not included in the field of view on the prior MRA of 02/04/2018. No definite acute intracranial large vascular occlusion. Given multifocal chronic appearing infarcts in the posterior circulation if acute on chronic ischemia were clinically suspected MRI would be more sensitive and specific in this regard.   MACRO: None   Signed by: Gunnar Nguyễn 6/15/2025 4:32 PM Dictation workstation:   [id number]    CT head wo IV contrast  Result Date: 6/15/2025  IMPRESSION: No acute intracranial abnormality such as hemorrhage or mass effect. Chronic changes as detailed. No CT evidence of a new large territorial infarct. Please note that MRI is more sensitive in the evaluation of acute ischemic stroke and could be obtained if clinically deemed appropriate. CRITICAL TEST/RESULTS: Notification initiated at 6/15/2025 3:10 PM.   Communicated with Rocío Perez MD on 6/15/2025 3:15 PM . CR_1 : VINNY   Transcribe Date/Time: Surendra 15 2025  3:11P Dictated by : BO LONG MD This examination was interpreted and the report reviewed and electronically signed by: BO LONG MD on Surendra 15 2025  3:19PM  EST      Cardiology, Vascular, and Other Imaging  ECG 12 lead  Result Date: 6/16/2025  Sinus rhythm Minimal ST elevation, inferior leads    CT head wo IV contrast  Result Date: 6/15/2025  * * *Final Report* * * DATE OF EXAM: Surendra 15 2025  3:10PM   MUC   0502  -  CT BRAIN ATTACK WO IVCON  / ACCESSION #  [id number] PROCEDURE REASON: Focal neuro deficit, new, fixed, or  worsening, < 4.5 hours, stroke suspected      * * * * Physician Interpretation * * * *  EXAMINATION: CT BRAIN ATTACK WO IVCON CLINICAL HISTORY: Altered mental status. Brain attack. TECHNIQUE: Routine CT of the brain without IV contrast. MQ: CTBA_4 CT Radiation dose: Integrated CT Dose-Length Product (DLP) for this visit =  789.65 mGy*cm CT Dose Reduction Employed: No dose reduction techniques were required COMPARISON: None. RESULT: Acute ischemic change: None. No CT evidence of a new large territorial infarct. Hemorrhage: No evidence of acute intracranial hemorrhage. ECASS hemorrhagic transformation score: Not Applicable Mass Lesion / Mass Effect: There is no evidence of an intracranial mass or extraaxial fluid collection.   No significant mass effect. Chronic change: Scattered patchy foci of low attenuation are present within the supratentorial white matter, a nonspecific finding that most commonly represents mild small vessel disease. Remote encephalomalacia/gliosis involving the medial right occipital lobe. Remote encephalomalacia/gliosis and lacunar infarcts involving the bilateral cerebellar hemispheres. Remote lacunar infarcts in left lentiform loss. Parenchyma: There is mild generalized volume loss for age.  The brain parenchyma is otherwise within normal limits for age. Ventricles: Ventriculomegaly concordant with the degree of parenchymal volume loss. Ex vacuo dilatation of the right occipital horn and fourth ventricle. Other: The visualized calvarium, skull base, orbits and extracranial soft tissues are normal. Localizer images: Not obtained, mobile stroke unit.    IMPRESSION: No acute intracranial abnormality such as hemorrhage or mass effect. Chronic changes as detailed. No CT evidence of a new large territorial infarct. Please note that MRI is more sensitive in the evaluation of acute ischemic stroke and could be obtained if clinically deemed appropriate. CRITICAL TEST/RESULTS: Notification initiated at  6/15/2025 3:10 PM.   Communicated with Rocío Perez MD on 6/15/2025 3:15 PM . CR_1 : VINNY   Transcribe Date/Time: Surendra 15 2025  3:11P Dictated by : BO LONG MD This examination was interpreted and the report reviewed and electronically signed by: BO LONG MD on Surendra 15 2025  3:19PM  EST         Assessment/Plan   Assessment & Plan  Stroke-like symptoms    Weakness    Acute CVA (cerebrovascular accident) (Multi)      -Patient to start DAPT x 21 days for CVA prophylaxis.  After 21-day duration, patient to discontinue Plavix 75 mg p.o. daily and continue ASA 81 mg p.o. daily as monotherapy.  It is also recommended for patient to start rosuvastatin 10 mg p.o. daily for additional CVA prophylaxis.  Patient has a documented intolerance to atorvastatin 2/2 myalgias, so instead we will do rosuvastatin as it is better tolerated regarding the side effects.  -Recommendations for needs during hospitalization and at discharge via PT, OT, and SLP.  Patient will definitely require rehabilitation in the outpatient setting, either in acute rehab or SNF.  -Echocardiogram pending completion; if unremarkable, general neurology to sign off on care.  -Continue promotion of lifestyle modifications, such as: Strict BP and glycemic control, dietary habit changes, incorporation of daily exercise regimen, adherence to all prescription/OTC medication schedules, attendance to all follow-up appointments, cessation from smoking if applicable, abstinence from alcohol and illicit drug use if applicable  -Patient to follow-up with PCP in 1 to 2 weeks post-discharge  -It is recommended for patient to wear a 14-day continuous cardiac event monitor at discharge show evidence of cardiac arrhythmias that could have attributed to CVA.  It is noted that patient does have evidence of bilateral old CVAs on neurologic imaging.  -Patient to follow-up with Bayron Nascimento-neurology nurse practitioner, in 2-3 months postdischarge     I spent 30  minutes in the professional and overall care of this patient.      Kim Bonilla, APRN-CNP         [1] [Held by provider] amLODIPine, 10 mg, oral, Daily  aspirin, 81 mg, oral, Daily  cefTRIAXone, 1 g, intravenous, q24h  cholecalciferol, 50 mcg, oral, Daily  diclofenac sodium, 2 g, Topical, BID  docusate sodium, 100 mg, oral, Daily  donepezil, 10 mg, oral, Nightly  famotidine, 20 mg, oral, Daily  fenofibrate, 160 mg, oral, Daily  heparin (porcine), 5,000 Units, subcutaneous, q8h  [Held by provider] hydrALAZINE, 50 mg, oral, TID  metoprolol tartrate, 75 mg, oral, BID  [2] sodium chloride 0.9%, 75 mL/hr, Last Rate: 75 mL/hr (06/17/25 0838)  [3] PRN medications: labetaloL, oxyCODONE-acetaminophen, oxygen
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
## 2025-06-18 ENCOUNTER — APPOINTMENT (OUTPATIENT)
Dept: CARDIOLOGY | Facility: HOSPITAL | Age: 75
DRG: 689 | End: 2025-06-18
Payer: MEDICARE

## 2025-06-18 LAB
ANION GAP SERPL CALC-SCNC: 13 MMOL/L (ref 10–20)
AORTIC VALVE PEAK VELOCITY: 1.82 M/S
AV PEAK GRADIENT: 13 MMHG
AVA (PEAK VEL): 1.58 CM2
BUN SERPL-MCNC: 25 MG/DL (ref 6–23)
CALCIUM SERPL-MCNC: 8.3 MG/DL (ref 8.6–10.3)
CHLORIDE SERPL-SCNC: 106 MMOL/L (ref 98–107)
CO2 SERPL-SCNC: 20 MMOL/L (ref 21–32)
CREAT SERPL-MCNC: 2.02 MG/DL (ref 0.5–1.3)
EGFRCR SERPLBLD CKD-EPI 2021: 34 ML/MIN/1.73M*2
EJECTION FRACTION APICAL 4 CHAMBER: 53.2
EJECTION FRACTION: 58 %
ERYTHROCYTE [DISTWIDTH] IN BLOOD BY AUTOMATED COUNT: 16.4 % (ref 11.5–14.5)
GLUCOSE BLD MANUAL STRIP-MCNC: 103 MG/DL (ref 74–99)
GLUCOSE BLD MANUAL STRIP-MCNC: 104 MG/DL (ref 74–99)
GLUCOSE BLD MANUAL STRIP-MCNC: 127 MG/DL (ref 74–99)
GLUCOSE BLD MANUAL STRIP-MCNC: 129 MG/DL (ref 74–99)
GLUCOSE BLD MANUAL STRIP-MCNC: 146 MG/DL (ref 74–99)
GLUCOSE SERPL-MCNC: 108 MG/DL (ref 74–99)
HCT VFR BLD AUTO: 28.9 % (ref 41–52)
HGB BLD-MCNC: 9.4 G/DL (ref 13.5–17.5)
HOLD SPECIMEN: NORMAL
LEFT ATRIUM VOLUME AREA LENGTH INDEX BSA: 24.7 ML/M2
LEFT VENTRICLE INTERNAL DIMENSION DIASTOLE: 3.91 CM (ref 3.5–6)
LEFT VENTRICULAR OUTFLOW TRACT DIAMETER: 2 CM
MCH RBC QN AUTO: 23.5 PG (ref 26–34)
MCHC RBC AUTO-ENTMCNC: 32.5 G/DL (ref 32–36)
MCV RBC AUTO: 72 FL (ref 80–100)
NRBC BLD-RTO: 0 /100 WBCS (ref 0–0)
PLATELET # BLD AUTO: 202 X10*3/UL (ref 150–450)
POTASSIUM SERPL-SCNC: 4.2 MMOL/L (ref 3.5–5.3)
RBC # BLD AUTO: 4 X10*6/UL (ref 4.5–5.9)
RIGHT VENTRICLE FREE WALL PEAK S': 11.7 CM/S
RIGHT VENTRICLE PEAK SYSTOLIC PRESSURE: 43 MMHG
SODIUM SERPL-SCNC: 135 MMOL/L (ref 136–145)
TRICUSPID ANNULAR PLANE SYSTOLIC EXCURSION: 1.4 CM
WBC # BLD AUTO: 12.2 X10*3/UL (ref 4.4–11.3)

## 2025-06-18 PROCEDURE — 93306 TTE W/DOPPLER COMPLETE: CPT | Performed by: INTERNAL MEDICINE

## 2025-06-18 PROCEDURE — C8929 TTE W OR WO FOL WCON,DOPPLER: HCPCS

## 2025-06-18 PROCEDURE — 94760 N-INVAS EAR/PLS OXIMETRY 1: CPT

## 2025-06-18 PROCEDURE — 99232 SBSQ HOSP IP/OBS MODERATE 35: CPT | Performed by: NURSE PRACTITIONER

## 2025-06-18 PROCEDURE — 2500000004 HC RX 250 GENERAL PHARMACY W/ HCPCS (ALT 636 FOR OP/ED): Performed by: INTERNAL MEDICINE

## 2025-06-18 PROCEDURE — 2500000001 HC RX 250 WO HCPCS SELF ADMINISTERED DRUGS (ALT 637 FOR MEDICARE OP)

## 2025-06-18 PROCEDURE — 2500000002 HC RX 250 W HCPCS SELF ADMINISTERED DRUGS (ALT 637 FOR MEDICARE OP, ALT 636 FOR OP/ED): Performed by: NURSE PRACTITIONER

## 2025-06-18 PROCEDURE — 85027 COMPLETE CBC AUTOMATED: CPT | Performed by: INTERNAL MEDICINE

## 2025-06-18 PROCEDURE — 2500000001 HC RX 250 WO HCPCS SELF ADMINISTERED DRUGS (ALT 637 FOR MEDICARE OP): Performed by: NURSE PRACTITIONER

## 2025-06-18 PROCEDURE — 2500000004 HC RX 250 GENERAL PHARMACY W/ HCPCS (ALT 636 FOR OP/ED)

## 2025-06-18 PROCEDURE — 80048 BASIC METABOLIC PNL TOTAL CA: CPT | Performed by: INTERNAL MEDICINE

## 2025-06-18 PROCEDURE — 36415 COLL VENOUS BLD VENIPUNCTURE: CPT | Performed by: INTERNAL MEDICINE

## 2025-06-18 PROCEDURE — 1200000002 HC GENERAL ROOM WITH TELEMETRY DAILY

## 2025-06-18 PROCEDURE — 82947 ASSAY GLUCOSE BLOOD QUANT: CPT

## 2025-06-18 RX ORDER — SODIUM CHLORIDE 9 MG/ML
75 INJECTION, SOLUTION INTRAVENOUS CONTINUOUS
Status: ACTIVE | OUTPATIENT
Start: 2025-06-18 | End: 2025-06-19

## 2025-06-18 RX ORDER — ZINC OXIDE 20 G/100G
1 OINTMENT TOPICAL DAILY PRN
Status: DISCONTINUED | OUTPATIENT
Start: 2025-06-18 | End: 2025-06-19 | Stop reason: HOSPADM

## 2025-06-18 RX ADMIN — ASPIRIN 81 MG: 81 TABLET, COATED ORAL at 10:42

## 2025-06-18 RX ADMIN — DONEPEZIL HYDROCHLORIDE 10 MG: 10 TABLET ORAL at 20:21

## 2025-06-18 RX ADMIN — CEFTRIAXONE 1 G: 1 INJECTION, SOLUTION INTRAVENOUS at 16:36

## 2025-06-18 RX ADMIN — HEPARIN SODIUM 5000 UNITS: 5000 INJECTION, SOLUTION INTRAVENOUS; SUBCUTANEOUS at 12:56

## 2025-06-18 RX ADMIN — DICLOFENAC SODIUM 2 G: 10 GEL TOPICAL at 21:54

## 2025-06-18 RX ADMIN — HEPARIN SODIUM 5000 UNITS: 5000 INJECTION, SOLUTION INTRAVENOUS; SUBCUTANEOUS at 05:06

## 2025-06-18 RX ADMIN — Medication 50 MCG: at 10:42

## 2025-06-18 RX ADMIN — FAMOTIDINE 20 MG: 20 TABLET, FILM COATED ORAL at 10:42

## 2025-06-18 RX ADMIN — METOPROLOL TARTRATE 75 MG: 50 TABLET, FILM COATED ORAL at 20:22

## 2025-06-18 RX ADMIN — PERFLUTREN 2 ML OF DILUTION: 6.52 INJECTION, SUSPENSION INTRAVENOUS at 10:24

## 2025-06-18 RX ADMIN — FENOFIBRATE 160 MG: 160 TABLET ORAL at 10:42

## 2025-06-18 RX ADMIN — DICLOFENAC SODIUM 2 G: 10 GEL TOPICAL at 10:43

## 2025-06-18 RX ADMIN — SODIUM CHLORIDE 75 ML/HR: 0.9 INJECTION, SOLUTION INTRAVENOUS at 12:56

## 2025-06-18 RX ADMIN — ROSUVASTATIN CALCIUM 10 MG: 10 TABLET, FILM COATED ORAL at 20:21

## 2025-06-18 RX ADMIN — HEPARIN SODIUM 5000 UNITS: 5000 INJECTION, SOLUTION INTRAVENOUS; SUBCUTANEOUS at 20:23

## 2025-06-18 RX ADMIN — METOPROLOL TARTRATE 75 MG: 50 TABLET, FILM COATED ORAL at 10:42

## 2025-06-18 RX ADMIN — CLOPIDOGREL BISULFATE 75 MG: 75 TABLET, FILM COATED ORAL at 10:42

## 2025-06-18 ASSESSMENT — PAIN - FUNCTIONAL ASSESSMENT
PAIN_FUNCTIONAL_ASSESSMENT: 0-10

## 2025-06-18 ASSESSMENT — PAIN SCALES - GENERAL
PAINLEVEL_OUTOF10: 0 - NO PAIN

## 2025-06-18 NOTE — CARE PLAN
The patient's goals for the shift include rest    The clinical goals for the shift include maintain safety and comfort    Over the shift, the patient did not make progress toward the following goals. Barriers to progression include Pt has recent stroke-like symptoms. Recommendations to address these barriers include Maintain safety; Maintain stable vitals.
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
## 2025-06-18 NOTE — PROGRESS NOTES
"Subjective   Echocardiogram completed with results pending.  If unremarkable, general neurology to sign off on care as CVA care path is complete.    Last Recorded Vitals  Blood pressure 170/83, pulse 82, temperature 36.3 °C (97.3 °F), temperature source Temporal, resp. rate 17, height 1.803 m (5' 10.98\"), weight 85.5 kg (188 lb 7.9 oz), SpO2 93%.    Relevant Results  Scheduled medications  Scheduled Medications[1]  Continuous medications  Continuous Medications[2]  PRN medications  PRN Medications[3]  XR chest 1 view  Result Date: 6/17/2025  Interpreted By:  Florence Johnson, STUDY: XR CHEST 1 VIEW;  6/17/2025 12:24 am   INDICATION: Signs/Symptoms:reported decreased spo2     COMPARISON: Chest x-ray 06/15/2025. CT abdomen and pelvis 06/15/2025.   ACCESSION NUMBER(S): [id number]   ORDERING CLINICIAN: HELEN ALDRIDGE   TECHNIQUE: Portable upright frontal view of the chest was obtained .   FINDINGS: Monitoring leads and radiopaque tubing are overlying the patient.   The cardiomediastinal silhouette is within normal limits.   There is mild bibasilar atelectasis. No pleural effusion or pneumothorax.         1.  Mild bibasilar atelectasis.       MACRO: None.   Signed by: Florence Johnson 6/17/2025 12:51 AM Dictation workstation:   [id number]    MR brain wo IV contrast  Result Date: 6/16/2025  Interpreted By:  Kemar Serna, STUDY: MR BRAIN WO IV CONTRAST;  6/16/2025 12:15 pm   INDICATION: Signs/Symptoms:R/o stroke.     COMPARISON: CT head and CTA head yesterday. MRI brain 02/04/2018.   ACCESSION NUMBER(S): [id number]   ORDERING CLINICIAN: ELYSE KLERMAN   TECHNIQUE: Axial T2, FLAIR, DWI, gradient echo T2 and sagittal and coronal T1 weighted images of brain were acquired.   FINDINGS: Tiny focus increased signal on the diffusion-weighted sequence to the right of midline in the lower cornel with decreased signal on the ADC map, possible recent tiny infarction. No acute intracranial hemorrhage.     No extra-axial fluid " collection. No intracranial mass. Major vascular flow voids intact.   Multiple old infarctions include bilateral cerebellar hemispheres, the cornel, left thalamus, right caudate body and left lentiform nucleus.   Mild to moderate white matter FLAIR signal increase, most commonly seen with chronic small vessel ischemic change.  Presence of older small infarctions in these areas not excluded.   Mild global brain parenchymal volume loss.   Peripheral mucosal inflammatory changes left maxillary sinus. No significant nonspecific mastoid fluid. Orbital contents unremarkable. No destructive osseous lesions identified.       1. Tiny focus increased signal on the diffusion-weighted sequence to the right of midline in the lower cornel with decreased signal on the ADC map, possible recent tiny infarction. 2. Mild to moderate white matter FLAIR signal increase, most commonly seen with chronic small vessel ischemic change.  Presence of older small infarctions in these areas not excluded. 3. Mild global brain parenchymal volume loss. 4. Multiple old supratentorial and infratentorial infarctions.   MACRO: None   Signed by: Kemar Serna 6/16/2025 1:13 PM Dictation workstation:   [id number]    ECG 12 lead  Result Date: 6/16/2025  Sinus rhythm Minimal ST elevation, inferior leads    CT abdomen pelvis wo IV contrast  Result Date: 6/15/2025  STUDY: CT Abdomen and Pelvis without IV Contrast; 6/15/2025 at 7:07 p.m. INDICATION: Hemorrhagic UTI. COMPARISON: None Available. ACCESSION NUMBER(S): [id number] ORDERING CLINICIAN: ELYSE H KLERMAN TECHNIQUE: CT of the abdomen and pelvis was performed.  Contiguous axial images were obtained at 3 mm slice thickness through the abdomen and pelvis. Coronal and sagittal reconstructions at 3 mm slice thickness were performed. No intravenous contrast was administered.  Automated mA/kV exposure control was utilized and patient examination was performed in strict accordance with principles of ALARA.  FINDINGS: Please note that the evaluation of vessels, lymph nodes and organs is limited without intravenous contrast.  LOWER CHEST: Cardiac size is enlarged with mild calcified coronary plaque.  No pericardial effusion.  Mild calcified plaque is seen in the included descending thoracic aorta.  Elevated right hemidiaphragm is noted with atelectasis in the lung bases and slightly diminished inspiration. There is a small sliding-type hiatal hernia.   ABDOMEN:  LIVER: No hepatomegaly.  Smooth surface contour.  Normal attenuation.  BILE DUCTS: No intrahepatic or extrahepatic biliary ductal dilatation.  GALLBLADDER: Gallbladder contains a small amount of sludge but appears otherwise unremarkable.  STOMACH: No abnormalities identified.  PANCREAS: There is mild fatty atrophy of the pancreas.  No masses or ductal dilatation.  SPLEEN: No splenomegaly or focal splenic lesion.  ADRENAL GLANDS: No thickening or nodules.  KIDNEYS AND URETERS: Mild renal cortical thinning and perinephric stranding is seen bilaterally.  No renal or ureteral calculi.  PELVIS:  BLADDER: Urinary bladder demonstrates excreted contrast.  There is a large diverticulum arising from the anterior aspect of the dome of the urinary bladder measuring up to 5.5 cm in diameter.  REPRODUCTIVE ORGANS: Prostate is upper normal in size.  BOWEL: Appendix is not definitively identified.  Terminal ileum is unremarkable.  Diverticulosis is present throughout the length of the colon.  There is a left mid abdominal descending colostomy with a Osman's pouch in the pelvis.  VESSELS: Mild calcified plaque is present in the abdominal aorta and iliac arteries.   PERITONEUM/RETROPERITONEUM/LYMPH NODES: No free fluid.  No pneumoperitoneum. No lymphadenopathy.  ABDOMINAL WALL: Small fat-containing umbilical hernia is noted. SOFT TISSUES: No abnormalities identified.  BONES: Beam hardening artifact from the left hip hardware limits visualization in the lower pelvis.  No acute  fracture or aggressive osseous lesion. There is a mild dextroconvex curvature of the lumbar spine.  Bridging osteophytes are seen throughout the spine.  Severe disc disease and chronic osseous fusion of the L3-4 vertebral bodies is noted with large anterior osteophyte.  There is osseous fusion of the L3-4 and L4-5 facets with moderate to severe facet arthrosis in the remaining lumbar facets.  Severe narrowing is seen of the left L3-4 neural foramen with moderate narrowing of the left L4-5 neural foramen. There is severe narrowing of the right L3-4 and L4-5 neural foramen. Left total hip arthroplasty is noted.    No definite acute intra-abdominal pathology identified. Large urinary bladder diverticulum. Cardiomegaly with mild coronary atherosclerosis. Extensive colonic diverticulosis with a left mid abdominal descending colostomy and Osman's pouch in the pelvis. Severe multilevel disc disease in the lumbar spine with severe narrowing of the bilateral L3-4 and right L4-5 neural foramen with osseous fusion of the L3-4 vertebrae. Signed by Ángel Eisenberg    CT brain attack head wo IV contrast  Result Date: 6/15/2025  Interpreted By:  Ubaldo Lion, STUDY: CT BRAIN ATTACK HEAD WO IV CONTRAST;  6/15/2025 3:44 pm   INDICATION: Signs/Symptoms:Stroke Evaluation.     COMPARISON: Correlation with MRI of the brain 02/04/2018, and CT head 02/03/2018   ACCESSION NUMBER(S): [id number]   ORDERING CLINICIAN: ELYSE KLERMAN   TECHNIQUE: Noncontrast axial CT scan of head was performed. Angled reformats in brain and bone windows were generated. The images were reviewed in bone, brain, blood and soft tissue windows.   FINDINGS: No acute hemorrhage or edema. 3.3 cm area of encephalomalacia of the right occipital lobe, identical to prior examination.   Miniscule low-attenuation area within the cornel (Series 201, Image 12) similar prior examination likely chronic, 2018 (series 201, Image 10)   Encephalomalacia of the right cerebellar  lobe axial image 12, stable.   There is chronic sinus disease left maxillary sinus appearing identical to 2018.       Chronic findings similar to prior examination 2018. No new findings or acute process.   Results sent to the patient's clinician via epic message at 4:57 p.m. 06/15/2025.   MACRO: Ubaldo Lion discussed the significance and urgency of this critical finding by EPIC secure chat with  ELYSE KLERMAN on 6/15/2025 at 4:59 pm.  (**-RCF-**) Findings:  See findings.     Signed by: Ubaldo Lion 6/15/2025 5:01 PM Dictation workstation:   [id number]    XR chest 1 view  Result Date: 6/15/2025  STUDY: Chest Radiograph;  06/15/2025 4:15 PM INDICATION: Stroke. COMPARISON: None available. ACCESSION NUMBER(S): [id number] ORDERING CLINICIAN: ELYSE H KLERMAN TECHNIQUE:  Frontal chest was obtained at 16:15 hours. FINDINGS: CARDIOMEDIASTINAL SILHOUETTE: Cardiomediastinal silhouette is normal in size and configuration.  LUNGS: Lungs are clear.  ABDOMEN: No remarkable upper abdominal findings.  BONES: No acute osseous changes.    No acute pulmonary pathology. Signed by Munir Damian MD    CT brain attack angio head and neck W and WO IV contrast  Result Date: 6/15/2025  Interpreted By:  Gunnar Nguyễn, STUDY: CT BRAIN ATTACK ANGIO HEAD AND NECK W AND WO IV CONTRAST;  6/15/2025 3:51 pm   INDICATION: Signs/Symptoms:stroke.     COMPARISON: 02/04/2018 MRI and MRA and 06/15/2025 head CT.   ACCESSION NUMBER(S): [id number]   ORDERING CLINICIAN: ELYSE KLERMAN   TECHNIQUE: 75 ML of Omnipaque 350 was administered intravenously and axial images of the head and neck were acquired.  Coronal, sagittal, and 3-D reconstructions were provided for review.   FINDINGS: Findings CT angiography neck: Origins of the great vessels are grossly unremarkable. Common carotid arteries carotid bifurcations and cervical internal carotid arteries are patent in the region of the neck. The proximal right vertebral artery is occluded with partial  reconstitution at approximately the C6 level similar to the prior MRA of the neck. Moderate stenosis of the distal right V2 segment with V3 segment increasing caliber probably due to retrograde filling. There is small stent or dense atherosclerotic calcification surrounding the origin of the left vertebral artery which demonstrates moderate luminal contour irregularity due to prominent atherosclerotic calcification the lower neck for instance on image 239 series 402 with approximately 80% stenosis by NASCET criteria in this region of uncertain chronicity with additional focal stenosis of the left V2 vertebral artery on image 377 series 2 measuring at least 40% with partial distal reconstitution via muscular branches.   Findings CT angiogram head: Evaluation is at least minimally limited due to venous contamination. Distal internal carotid arteries are patent and branch appropriately into the anterior and middle cerebral arteries without evidence of hemodynamically significant stenosis or other abnormality identified. The proximal anterior and middle cerebral arteries are patent. The distal vertebral arteries are patent and join appropriately to form the basilar artery with the typical distal branching pattern and with proximal patency of the posterior cerebral arteries. Small bilateral posterior communicating arteries are noted.       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 Relatively high-grade stenosis of the proximal left vertebral artery measuring approximately 80% by NASCET criteria of uncertain chronicity with additional focal stenosis of the left vertebral artery of uncertain chronicity with respect to the proximal stenosis as the proximal vertebral artery was not included in the field of view on the prior MRA of 02/04/2018. No definite acute intracranial large vascular occlusion. Given multifocal chronic appearing infarcts in the posterior  circulation if acute on chronic ischemia were clinically suspected MRI would be more sensitive and specific in this regard.   MACRO: None   Signed by: Gunnar Nguyễn 6/15/2025 4:32 PM Dictation workstation:   [id number]    CT head wo IV contrast  Result Date: 6/15/2025  * * *Final Report* * * DATE OF EXAM: Surendra 15 2025  3:10PM   MUC   0502  -  CT BRAIN ATTACK WO IVCON  / ACCESSION #  [id number] PROCEDURE REASON: Focal neuro deficit, new, fixed, or worsening, < 4.5 hours, stroke suspected      * * * * Physician Interpretation * * * *  EXAMINATION: CT BRAIN ATTACK WO IVCON CLINICAL HISTORY: Altered mental status. Brain attack. TECHNIQUE: Routine CT of the brain without IV contrast. MQ: CTBA_4 CT Radiation dose: Integrated CT Dose-Length Product (DLP) for this visit =  789.65 mGy*cm CT Dose Reduction Employed: No dose reduction techniques were required COMPARISON: None. RESULT: Acute ischemic change: None. No CT evidence of a new large territorial infarct. Hemorrhage: No evidence of acute intracranial hemorrhage. ECASS hemorrhagic transformation score: Not Applicable Mass Lesion / Mass Effect: There is no evidence of an intracranial mass or extraaxial fluid collection.   No significant mass effect. Chronic change: Scattered patchy foci of low attenuation are present within the supratentorial white matter, a nonspecific finding that most commonly represents mild small vessel disease. Remote encephalomalacia/gliosis involving the medial right occipital lobe. Remote encephalomalacia/gliosis and lacunar infarcts involving the bilateral cerebellar hemispheres. Remote lacunar infarcts in left lentiform loss. Parenchyma: There is mild generalized volume loss for age.  The brain parenchyma is otherwise within normal limits for age. Ventricles: Ventriculomegaly concordant with the degree of parenchymal volume loss. Ex vacuo dilatation of the right occipital horn and fourth ventricle. Other: The visualized calvarium, skull base,  orbits and extracranial soft tissues are normal. Localizer images: Not obtained, mobile stroke unit.    IMPRESSION: No acute intracranial abnormality such as hemorrhage or mass effect. Chronic changes as detailed. No CT evidence of a new large territorial infarct. Please note that MRI is more sensitive in the evaluation of acute ischemic stroke and could be obtained if clinically deemed appropriate. CRITICAL TEST/RESULTS: Notification initiated at 6/15/2025 3:10 PM.   Communicated with Rocío Perez MD on 6/15/2025 3:15 PM . CR_1 : VINNY   Transcribe Date/Time: Surendra 15 2025  3:11P Dictated by : BO LONG MD This examination was interpreted and the report reviewed and electronically signed by: BO LONG MD on Surendra 15 2025  3:19PM  EST    Results for orders placed or performed during the hospital encounter of 06/15/25 (from the past 24 hours)   Urinalysis with Reflex Culture and Microscopic   Result Value Ref Range    Color, Urine Yellow Light-Yellow, Yellow, Dark-Yellow    Appearance, Urine Turbid (N) Clear    Specific Gravity, Urine 1.016 1.005 - 1.035    pH, Urine 6.0 5.0, 5.5, 6.0, 6.5, 7.0, 7.5, 8.0    Protein, Urine 70 (1+) (A) NEGATIVE, 10 (TRACE), 20 (TRACE) mg/dL    Glucose, Urine Normal Normal mg/dL    Blood, Urine 0.2 (2+) (A) NEGATIVE mg/dL    Ketones, Urine NEGATIVE NEGATIVE mg/dL    Bilirubin, Urine NEGATIVE NEGATIVE mg/dL    Urobilinogen, Urine Normal Normal mg/dL    Nitrite, Urine NEGATIVE NEGATIVE    Leukocyte Esterase, Urine 500 Khalif/uL (A) NEGATIVE   Extra Urine Gray Tube   Result Value Ref Range    Extra Tube Hold for add-ons.    Microscopic Only, Urine   Result Value Ref Range    WBC, Urine >50 (A) 1-5, NONE /HPF    WBC Clumps, Urine MANY Reference range not established. /HPF    RBC, Urine >20 (A) NONE, 1-2, 3-5 /HPF    Bacteria, Urine 1+ (A) NONE SEEN /HPF    Mucus, Urine FEW Reference range not established. /LPF   POCT GLUCOSE   Result Value Ref Range    POCT Glucose 127 (H) 74  - 99 mg/dL   POCT GLUCOSE   Result Value Ref Range    POCT Glucose 166 (H) 74 - 99 mg/dL   CBC   Result Value Ref Range    WBC 12.2 (H) 4.4 - 11.3 x10*3/uL    nRBC 0.0 0.0 - 0.0 /100 WBCs    RBC 4.00 (L) 4.50 - 5.90 x10*6/uL    Hemoglobin 9.4 (L) 13.5 - 17.5 g/dL    Hematocrit 28.9 (L) 41.0 - 52.0 %    MCV 72 (L) 80 - 100 fL    MCH 23.5 (L) 26.0 - 34.0 pg    MCHC 32.5 32.0 - 36.0 g/dL    RDW 16.4 (H) 11.5 - 14.5 %    Platelets 202 150 - 450 x10*3/uL   Basic Metabolic Panel   Result Value Ref Range    Glucose 108 (H) 74 - 99 mg/dL    Sodium 135 (L) 136 - 145 mmol/L    Potassium 4.2 3.5 - 5.3 mmol/L    Chloride 106 98 - 107 mmol/L    Bicarbonate 20 (L) 21 - 32 mmol/L    Anion Gap 13 10 - 20 mmol/L    Urea Nitrogen 25 (H) 6 - 23 mg/dL    Creatinine 2.02 (H) 0.50 - 1.30 mg/dL    eGFR 34 (L) >60 mL/min/1.73m*2    Calcium 8.3 (L) 8.6 - 10.3 mg/dL   POCT GLUCOSE   Result Value Ref Range    POCT Glucose 103 (H) 74 - 99 mg/dL   POCT GLUCOSE   Result Value Ref Range    POCT Glucose 104 (H) 74 - 99 mg/dL   Transthoracic Echo Complete   Result Value Ref Range    BSA 2.07 m2   POCT GLUCOSE   Result Value Ref Range    POCT Glucose 129 (H) 74 - 99 mg/dL             NIH Stroke Scale  1A. Level of Consciousness: Alert, Keenly Responsive  1B. Ask Month and Age: Both Questions Right  1C. Blink Eyes & Squeeze Hands: Performs Both Tasks  2. Best Gaze: Normal  3. Visual: No Visual Loss  4. Facial Palsy: Partial Paralysis  5A. Motor - Left Arm: No Drift  5B. Motor - Right Arm: No Drift  6A. Motor - Left Leg: Drift  6B. Motor - Right Leg: Drift  7. Limb Ataxia: Absent  8. Sensory Loss: Normal  9. Best Language: Mild-to-Moderate Aphasia  10. Dysarthria: Normal  11. Extinction and Inattention: No Abnormality  NIH Stroke Scale: 5           Dariel Coma Scale  Best Eye Response: Spontaneous  Best Verbal Response: Oriented  Best Motor Response: Follows commands  Todd Coma Scale Score: 15                 I have personally reviewed the  following imaging results:   Imaging  XR chest 1 view  Result Date: 6/17/2025  1.  Mild bibasilar atelectasis.       MACRO: None.   Signed by: Florence Johnson 6/17/2025 12:51 AM Dictation workstation:   [id number]    MR brain wo IV contrast  Result Date: 6/16/2025  1. Tiny focus increased signal on the diffusion-weighted sequence to the right of midline in the lower cornel with decreased signal on the ADC map, possible recent tiny infarction. 2. Mild to moderate white matter FLAIR signal increase, most commonly seen with chronic small vessel ischemic change.  Presence of older small infarctions in these areas not excluded. 3. Mild global brain parenchymal volume loss. 4. Multiple old supratentorial and infratentorial infarctions.   MACRO: None   Signed by: Kemar Serna 6/16/2025 1:13 PM Dictation workstation:   [id number]    CT abdomen pelvis wo IV contrast  Result Date: 6/15/2025  No definite acute intra-abdominal pathology identified. Large urinary bladder diverticulum. Cardiomegaly with mild coronary atherosclerosis. Extensive colonic diverticulosis with a left mid abdominal descending colostomy and Osman's pouch in the pelvis. Severe multilevel disc disease in the lumbar spine with severe narrowing of the bilateral L3-4 and right L4-5 neural foramen with osseous fusion of the L3-4 vertebrae. Signed by Ángel Eisenberg    CT brain attack head wo IV contrast  Result Date: 6/15/2025  Chronic findings similar to prior examination 2018. No new findings or acute process.   Results sent to the patient's clinician via epic message at 4:57 p.m. 06/15/2025.   MACRO: Ubaldo Lion discussed the significance and urgency of this critical finding by EPIC secure chat with  ELYSE KLERMAN on 6/15/2025 at 4:59 pm.  (**-RCF-**) Findings:  See findings.     Signed by: Ubaldo Lion 6/15/2025 5:01 PM Dictation workstation:   [id number]    XR chest 1 view  Result Date: 6/15/2025  No acute pulmonary pathology. Signed by  Munir Damian MD    CT brain attack angio head and neck W and WO IV contrast  Result Date: 6/15/2025  Occluded proximal right vertebral artery similar to the MRA 02/04/2018 with distal reconstitution. Relatively high-grade stenosis of the proximal left vertebral artery measuring approximately 80% by NASCET criteria of uncertain chronicity with additional focal stenosis of the left vertebral artery of uncertain chronicity with respect to the proximal stenosis as the proximal vertebral artery was not included in the field of view on the prior MRA of 02/04/2018. No definite acute intracranial large vascular occlusion. Given multifocal chronic appearing infarcts in the posterior circulation if acute on chronic ischemia were clinically suspected MRI would be more sensitive and specific in this regard.   MACRO: None   Signed by: Gunnar Nguyễn 6/15/2025 4:32 PM Dictation workstation:   [id number]    CT head wo IV contrast  Result Date: 6/15/2025  IMPRESSION: No acute intracranial abnormality such as hemorrhage or mass effect. Chronic changes as detailed. No CT evidence of a new large territorial infarct. Please note that MRI is more sensitive in the evaluation of acute ischemic stroke and could be obtained if clinically deemed appropriate. CRITICAL TEST/RESULTS: Notification initiated at 6/15/2025 3:10 PM.   Communicated with Rocío Perez MD on 6/15/2025 3:15 PM . CR_1 : VINNY   Transcribe Date/Time: Surendra 15 2025  3:11P Dictated by : BO LONG MD This examination was interpreted and the report reviewed and electronically signed by: BO LONG MD on Surendra 15 2025  3:19PM  EST      Cardiology, Vascular, and Other Imaging  ECG 12 lead  Result Date: 6/16/2025  Sinus rhythm Minimal ST elevation, inferior leads    CT head wo IV contrast  Result Date: 6/15/2025  * * *Final Report* * * DATE OF EXAM: Surendra 15 2025  3:10PM   MUC   0502  -  CT BRAIN ATTACK WO IVCON  / ACCESSION #  [id number] PROCEDURE REASON: Focal  neuro deficit, new, fixed, or worsening, < 4.5 hours, stroke suspected      * * * * Physician Interpretation * * * *  EXAMINATION: CT BRAIN ATTACK WO IVCON CLINICAL HISTORY: Altered mental status. Brain attack. TECHNIQUE: Routine CT of the brain without IV contrast. MQ: CTBA_4 CT Radiation dose: Integrated CT Dose-Length Product (DLP) for this visit =  789.65 mGy*cm CT Dose Reduction Employed: No dose reduction techniques were required COMPARISON: None. RESULT: Acute ischemic change: None. No CT evidence of a new large territorial infarct. Hemorrhage: No evidence of acute intracranial hemorrhage. ECASS hemorrhagic transformation score: Not Applicable Mass Lesion / Mass Effect: There is no evidence of an intracranial mass or extraaxial fluid collection.   No significant mass effect. Chronic change: Scattered patchy foci of low attenuation are present within the supratentorial white matter, a nonspecific finding that most commonly represents mild small vessel disease. Remote encephalomalacia/gliosis involving the medial right occipital lobe. Remote encephalomalacia/gliosis and lacunar infarcts involving the bilateral cerebellar hemispheres. Remote lacunar infarcts in left lentiform loss. Parenchyma: There is mild generalized volume loss for age.  The brain parenchyma is otherwise within normal limits for age. Ventricles: Ventriculomegaly concordant with the degree of parenchymal volume loss. Ex vacuo dilatation of the right occipital horn and fourth ventricle. Other: The visualized calvarium, skull base, orbits and extracranial soft tissues are normal. Localizer images: Not obtained, mobile stroke unit.    IMPRESSION: No acute intracranial abnormality such as hemorrhage or mass effect. Chronic changes as detailed. No CT evidence of a new large territorial infarct. Please note that MRI is more sensitive in the evaluation of acute ischemic stroke and could be obtained if clinically deemed appropriate. CRITICAL  TEST/RESULTS: Notification initiated at 6/15/2025 3:10 PM.   Communicated with Rocío Perez MD on 6/15/2025 3:15 PM . CR_1 : VINNY   Transcribe Date/Time: Surendra 15 2025  3:11P Dictated by : BO LONG MD This examination was interpreted and the report reviewed and electronically signed by: BO LONG MD on Surendra 15 2025  3:19PM  EST         Assessment/Plan   Assessment & Plan  Stroke-like symptoms    Weakness    Acute CVA (cerebrovascular accident) (Multi)      -Patient to start DAPT x 21 days for CVA prophylaxis.  After 21-day duration, patient to discontinue Plavix 75 mg p.o. daily and continue ASA 81 mg p.o. daily as monotherapy.  It is also recommended for patient to start rosuvastatin 10 mg p.o. daily for additional CVA prophylaxis.  Patient has a documented intolerance to atorvastatin 2/2 myalgias, so instead we will do rosuvastatin as it is better tolerated regarding the side effects.  -Echo completed with results pending; if unremarkable, general neurology to sign off on care as CVA care path is complete.  If abnormalities are noted, will defer additional workup to attending provider.  -Recommendations for needs during hospitalization and at discharge via PT, OT, and SLP.  Patient will definitely require rehabilitation in the outpatient setting, either in acute rehab or SNF.  -Continue promotion of lifestyle modifications, such as: Strict BP and glycemic control, dietary habit changes, incorporation of daily exercise regimen, adherence to all prescription/OTC medication schedules, attendance to all follow-up appointments, cessation from smoking if applicable, abstinence from alcohol and illicit drug use if applicable  -Patient to follow-up with PCP in 1 to 2 weeks post-discharge  -It is recommended for patient to wear a 14-day continuous cardiac event monitor at discharge show evidence of cardiac arrhythmias that could have attributed to CVA.  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  -Patient to follow-up with Bayron Nascimento-neurology nurse practitioner, in 2-3 months postdischarge    Neurology to sign off at this time. Thank you for the opportunity to be a part of this patient's multidisciplinary treatment team.  If any additional questions or concerns arise, please do not hesitate to contact me or the on-call neurologist via [employer] Secure Chat.    I spent 30 minutes in the professional and overall care of this patient.      Kim Bonilla, APRN-CNP         [1] [Held by provider] amLODIPine, 10 mg, oral, Daily  aspirin, 81 mg, oral, Daily  cefTRIAXone, 1 g, intravenous, q24h  cholecalciferol, 50 mcg, oral, Daily  clopidogrel, 75 mg, oral, Daily  diclofenac sodium, 2 g, Topical, BID  docusate sodium, 100 mg, oral, Daily  donepezil, 10 mg, oral, Nightly  famotidine, 20 mg, oral, Daily  fenofibrate, 160 mg, oral, Daily  heparin (porcine), 5,000 Units, subcutaneous, q8h  [Held by provider] hydrALAZINE, 50 mg, oral, TID  metoprolol tartrate, 75 mg, oral, BID  rosuvastatin, 10 mg, oral, Nightly     [2] sodium chloride 0.9%, 75 mL/hr, Last Rate: 75 mL/hr (06/18/25 1256)     [3] PRN medications: oxyCODONE-acetaminophen, oxygen, zinc oxide

## 2025-06-19 VITALS
TEMPERATURE: 95.5 F | OXYGEN SATURATION: 92 % | RESPIRATION RATE: 18 BRPM | WEIGHT: 188.49 LBS | HEIGHT: 71 IN | DIASTOLIC BLOOD PRESSURE: 74 MMHG | SYSTOLIC BLOOD PRESSURE: 149 MMHG | BODY MASS INDEX: 26.39 KG/M2 | HEART RATE: 76 BPM

## 2025-06-19 LAB
ANION GAP SERPL CALC-SCNC: 14 MMOL/L (ref 10–20)
BACTERIA UR CULT: NORMAL
BUN SERPL-MCNC: 21 MG/DL (ref 6–23)
CALCIUM SERPL-MCNC: 8.6 MG/DL (ref 8.6–10.3)
CHLORIDE SERPL-SCNC: 106 MMOL/L (ref 98–107)
CO2 SERPL-SCNC: 21 MMOL/L (ref 21–32)
CREAT SERPL-MCNC: 1.82 MG/DL (ref 0.5–1.3)
EGFRCR SERPLBLD CKD-EPI 2021: 38 ML/MIN/1.73M*2
ERYTHROCYTE [DISTWIDTH] IN BLOOD BY AUTOMATED COUNT: 16.4 % (ref 11.5–14.5)
GLUCOSE BLD MANUAL STRIP-MCNC: 102 MG/DL (ref 74–99)
GLUCOSE BLD MANUAL STRIP-MCNC: 105 MG/DL (ref 74–99)
GLUCOSE SERPL-MCNC: 105 MG/DL (ref 74–99)
HCT VFR BLD AUTO: 30.4 % (ref 41–52)
HGB BLD-MCNC: 9.4 G/DL (ref 13.5–17.5)
MCH RBC QN AUTO: 22.9 PG (ref 26–34)
MCHC RBC AUTO-ENTMCNC: 30.9 G/DL (ref 32–36)
MCV RBC AUTO: 74 FL (ref 80–100)
NRBC BLD-RTO: 0 /100 WBCS (ref 0–0)
PLATELET # BLD AUTO: 225 X10*3/UL (ref 150–450)
POTASSIUM SERPL-SCNC: 4.4 MMOL/L (ref 3.5–5.3)
RBC # BLD AUTO: 4.1 X10*6/UL (ref 4.5–5.9)
SODIUM SERPL-SCNC: 137 MMOL/L (ref 136–145)
WBC # BLD AUTO: 12.3 X10*3/UL (ref 4.4–11.3)

## 2025-06-19 PROCEDURE — 2500000004 HC RX 250 GENERAL PHARMACY W/ HCPCS (ALT 636 FOR OP/ED): Performed by: INTERNAL MEDICINE

## 2025-06-19 PROCEDURE — 2500000001 HC RX 250 WO HCPCS SELF ADMINISTERED DRUGS (ALT 637 FOR MEDICARE OP): Performed by: NURSE PRACTITIONER

## 2025-06-19 PROCEDURE — 80048 BASIC METABOLIC PNL TOTAL CA: CPT | Performed by: INTERNAL MEDICINE

## 2025-06-19 PROCEDURE — 36415 COLL VENOUS BLD VENIPUNCTURE: CPT | Performed by: INTERNAL MEDICINE

## 2025-06-19 PROCEDURE — 2500000001 HC RX 250 WO HCPCS SELF ADMINISTERED DRUGS (ALT 637 FOR MEDICARE OP)

## 2025-06-19 PROCEDURE — 2500000001 HC RX 250 WO HCPCS SELF ADMINISTERED DRUGS (ALT 637 FOR MEDICARE OP): Performed by: INTERNAL MEDICINE

## 2025-06-19 PROCEDURE — 2500000004 HC RX 250 GENERAL PHARMACY W/ HCPCS (ALT 636 FOR OP/ED)

## 2025-06-19 PROCEDURE — 85027 COMPLETE CBC AUTOMATED: CPT | Performed by: INTERNAL MEDICINE

## 2025-06-19 PROCEDURE — 82947 ASSAY GLUCOSE BLOOD QUANT: CPT

## 2025-06-19 RX ORDER — SODIUM CHLORIDE 9 MG/ML
75 INJECTION, SOLUTION INTRAVENOUS CONTINUOUS
Status: DISCONTINUED | OUTPATIENT
Start: 2025-06-19 | End: 2025-06-19 | Stop reason: HOSPADM

## 2025-06-19 RX ORDER — CLOPIDOGREL BISULFATE 75 MG/1
75 TABLET ORAL DAILY
Start: 2025-06-19 | End: 2025-07-08

## 2025-06-19 RX ORDER — ZINC OXIDE 20 G/100G
1 OINTMENT TOPICAL DAILY PRN
Start: 2025-06-19

## 2025-06-19 RX ORDER — ROSUVASTATIN CALCIUM 10 MG/1
10 TABLET, COATED ORAL NIGHTLY
Start: 2025-06-19

## 2025-06-19 RX ORDER — CIPROFLOXACIN 500 MG/1
500 TABLET, FILM COATED ORAL 2 TIMES DAILY
Start: 2025-06-19 | End: 2025-06-26

## 2025-06-19 RX ADMIN — CLOPIDOGREL BISULFATE 75 MG: 75 TABLET, FILM COATED ORAL at 08:27

## 2025-06-19 RX ADMIN — FENOFIBRATE 160 MG: 160 TABLET ORAL at 08:25

## 2025-06-19 RX ADMIN — HYDRALAZINE HYDROCHLORIDE 50 MG: 50 TABLET ORAL at 14:35

## 2025-06-19 RX ADMIN — DOCUSATE SODIUM 100 MG: 100 CAPSULE, LIQUID FILLED ORAL at 08:26

## 2025-06-19 RX ADMIN — SODIUM CHLORIDE 75 ML/HR: 0.9 INJECTION, SOLUTION INTRAVENOUS at 09:17

## 2025-06-19 RX ADMIN — HEPARIN SODIUM 5000 UNITS: 5000 INJECTION, SOLUTION INTRAVENOUS; SUBCUTANEOUS at 14:35

## 2025-06-19 RX ADMIN — HYDRALAZINE HYDROCHLORIDE 50 MG: 50 TABLET ORAL at 09:16

## 2025-06-19 RX ADMIN — HEPARIN SODIUM 5000 UNITS: 5000 INJECTION, SOLUTION INTRAVENOUS; SUBCUTANEOUS at 06:15

## 2025-06-19 RX ADMIN — AMLODIPINE BESYLATE 10 MG: 10 TABLET ORAL at 09:16

## 2025-06-19 RX ADMIN — ASPIRIN 81 MG: 81 TABLET, COATED ORAL at 08:26

## 2025-06-19 RX ADMIN — FAMOTIDINE 20 MG: 20 TABLET, FILM COATED ORAL at 08:29

## 2025-06-19 RX ADMIN — METOPROLOL TARTRATE 75 MG: 50 TABLET, FILM COATED ORAL at 08:25

## 2025-06-19 RX ADMIN — Medication 50 MCG: at 08:30

## 2025-06-19 ASSESSMENT — PAIN SCALES - GENERAL
PAINLEVEL_OUTOF10: 0 - NO PAIN
PAINLEVEL_OUTOF10: 0 - NO PAIN

## 2025-06-19 ASSESSMENT — PAIN - FUNCTIONAL ASSESSMENT
PAIN_FUNCTIONAL_ASSESSMENT: 0-10
PAIN_FUNCTIONAL_ASSESSMENT: 0-10

## 2025-06-19 NOTE — PROGRESS NOTES
Pt discharging back to [facility].  Transport time is 1530--staff and family notified.   Richa Bailey RN TCC
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
## 2025-06-19 NOTE — CARE PLAN
The patient's goals for the shift include rest    The clinical goals for the shift include maintain safety    Over the shift, the patient did make progress toward the following goals. Pt maintained safety and comfort.
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
## 2025-07-11 ENCOUNTER — HOSPITAL ENCOUNTER (OUTPATIENT)
Dept: CARDIOLOGY | Facility: CLINIC | Age: 75
Discharge: HOME | End: 2025-07-11
Payer: MEDICARE

## 2025-07-11 DIAGNOSIS — R29.90 STROKE-LIKE SYMPTOMS: ICD-10-CM

## 2025-07-11 DIAGNOSIS — G45.9 TRANSIENT CEREBRAL ISCHEMIA, UNSPECIFIED TYPE: ICD-10-CM

## 2025-07-11 PROCEDURE — 93246 EXT ECG>7D<15D RECORDING: CPT

## 2025-07-18 LAB
ATRIAL RATE: 79 BPM
P AXIS: 60 DEGREES
PR INTERVAL: 206 MS
Q ONSET: 249 MS
QRS COUNT: 13 BEATS
QRS DURATION: 99 MS
QT INTERVAL: 380 MS
QTC CALCULATION(BAZETT): 436 MS
QTC FREDERICIA: 416 MS
R AXIS: 1 DEGREES
T AXIS: 62 DEGREES
T OFFSET: 439 MS
VENTRICULAR RATE: 79 BPM